# Patient Record
Sex: FEMALE | Race: WHITE | NOT HISPANIC OR LATINO | Employment: OTHER | URBAN - METROPOLITAN AREA
[De-identification: names, ages, dates, MRNs, and addresses within clinical notes are randomized per-mention and may not be internally consistent; named-entity substitution may affect disease eponyms.]

---

## 2023-03-17 DIAGNOSIS — E66.9 DIABETES MELLITUS TYPE 2 IN OBESE: Primary | ICD-10-CM

## 2023-03-17 DIAGNOSIS — E11.69 DIABETES MELLITUS TYPE 2 IN OBESE: Primary | ICD-10-CM

## 2023-03-17 DIAGNOSIS — I10 PRIMARY HYPERTENSION: ICD-10-CM

## 2023-03-17 RX ORDER — DULOXETIN HYDROCHLORIDE 60 MG/1
CAPSULE, DELAYED RELEASE ORAL
COMMUNITY
Start: 2016-07-26 | End: 2023-04-05 | Stop reason: SINTOL

## 2023-03-17 RX ORDER — TRIAMTERENE/HYDROCHLOROTHIAZID 37.5-25 MG
1 TABLET ORAL DAILY
COMMUNITY
Start: 2014-12-05 | End: 2023-06-30

## 2023-03-17 RX ORDER — FAMOTIDINE 40 MG/1
1 TABLET, FILM COATED ORAL 2 TIMES DAILY
COMMUNITY
Start: 2022-08-25

## 2023-03-17 RX ORDER — IBUPROFEN 200 MG
CAPSULE ORAL
COMMUNITY
Start: 2019-11-13

## 2023-03-17 RX ORDER — ALLOPURINOL 100 MG/1
TABLET ORAL 2 TIMES DAILY
COMMUNITY
End: 2023-04-05 | Stop reason: ALTCHOICE

## 2023-03-17 RX ORDER — DULOXETIN HYDROCHLORIDE 30 MG/1
CAPSULE, DELAYED RELEASE ORAL 2 TIMES DAILY
COMMUNITY
End: 2024-02-20 | Stop reason: WASHOUT

## 2023-03-17 RX ORDER — METOPROLOL SUCCINATE 50 MG/1
50 TABLET, EXTENDED RELEASE ORAL DAILY
Qty: 30 TABLET | Refills: 0 | Status: SHIPPED | OUTPATIENT
Start: 2023-03-17 | End: 2023-03-31

## 2023-03-17 RX ORDER — IBUPROFEN 600 MG/1
TABLET ORAL EVERY 6 HOURS
COMMUNITY
Start: 2022-08-15 | End: 2023-04-05 | Stop reason: ALTCHOICE

## 2023-03-17 RX ORDER — LEVOTHYROXINE SODIUM 150 UG/1
1 TABLET ORAL DAILY
COMMUNITY
Start: 2017-09-28 | End: 2023-04-06 | Stop reason: SDUPTHER

## 2023-03-17 RX ORDER — METOPROLOL SUCCINATE 50 MG/1
1 TABLET, EXTENDED RELEASE ORAL DAILY
COMMUNITY
Start: 2013-09-27 | End: 2023-03-17 | Stop reason: SDUPTHER

## 2023-03-17 RX ORDER — FUROSEMIDE 40 MG/1
1 TABLET ORAL DAILY
COMMUNITY
Start: 2015-05-06 | End: 2023-03-31 | Stop reason: SDUPTHER

## 2023-03-17 RX ORDER — FLUTICASONE PROPIONATE AND SALMETEROL 250; 50 UG/1; UG/1
1 POWDER RESPIRATORY (INHALATION)
COMMUNITY
Start: 2013-03-18

## 2023-03-17 RX ORDER — FAMOTIDINE 20 MG/1
TABLET, FILM COATED ORAL
COMMUNITY
Start: 2021-07-16 | End: 2023-04-05 | Stop reason: ALTCHOICE

## 2023-03-17 RX ORDER — LIDOCAINE 40 MG/G
CREAM TOPICAL
COMMUNITY
Start: 2020-08-03 | End: 2023-04-05 | Stop reason: ALTCHOICE

## 2023-03-17 RX ORDER — EZETIMIBE 10 MG/1
1 TABLET ORAL DAILY
COMMUNITY
Start: 2018-03-11 | End: 2023-03-31 | Stop reason: SDUPTHER

## 2023-03-17 RX ORDER — METFORMIN HYDROCHLORIDE 500 MG/1
1 TABLET ORAL 2 TIMES DAILY
COMMUNITY
Start: 2019-11-20 | End: 2023-03-17 | Stop reason: SDUPTHER

## 2023-03-17 RX ORDER — ALBUTEROL SULFATE 0.63 MG/3ML
0.63 SOLUTION RESPIRATORY (INHALATION)
COMMUNITY
Start: 2021-07-16

## 2023-03-17 RX ORDER — ALLOPURINOL 300 MG/1
1 TABLET ORAL DAILY
COMMUNITY
Start: 2015-10-23 | End: 2023-03-31 | Stop reason: SDUPTHER

## 2023-03-17 RX ORDER — MELOXICAM 7.5 MG/1
1 TABLET ORAL DAILY
COMMUNITY
Start: 2013-10-02 | End: 2023-04-05 | Stop reason: SINTOL

## 2023-03-17 RX ORDER — OLMESARTAN MEDOXOMIL 40 MG/1
1 TABLET ORAL DAILY
COMMUNITY
Start: 2017-07-07 | End: 2023-03-31 | Stop reason: SDUPTHER

## 2023-03-17 RX ORDER — ESOMEPRAZOLE MAGNESIUM 40 MG/1
40 CAPSULE, DELAYED RELEASE ORAL DAILY
COMMUNITY
End: 2023-07-19 | Stop reason: SDUPTHER

## 2023-03-17 RX ORDER — ERGOCALCIFEROL 1.25 MG/1
1 CAPSULE ORAL
COMMUNITY
Start: 2014-06-18 | End: 2023-04-06 | Stop reason: ALTCHOICE

## 2023-03-17 RX ORDER — ATORVASTATIN CALCIUM 40 MG/1
1 TABLET, FILM COATED ORAL DAILY
COMMUNITY
Start: 2014-06-18 | End: 2023-04-05 | Stop reason: SDUPTHER

## 2023-03-17 RX ORDER — SILVER SULFADIAZINE 10 G/1000G
CREAM TOPICAL 2 TIMES DAILY
COMMUNITY
Start: 2022-02-14

## 2023-03-17 RX ORDER — OLOPATADINE HYDROCHLORIDE 2 MG/ML
SOLUTION/ DROPS OPHTHALMIC
COMMUNITY
Start: 2013-04-26

## 2023-03-17 RX ORDER — TRIAMCINOLONE ACETONIDE 1 MG/G
CREAM TOPICAL
COMMUNITY
Start: 2019-08-14 | End: 2023-04-05 | Stop reason: ALTCHOICE

## 2023-03-17 RX ORDER — COLCHICINE 0.6 MG/1
1 TABLET ORAL 3 TIMES DAILY
COMMUNITY
Start: 2021-03-11 | End: 2023-04-05 | Stop reason: ALTCHOICE

## 2023-03-17 RX ORDER — MUPIROCIN 20 MG/G
OINTMENT TOPICAL
COMMUNITY
Start: 2022-02-21

## 2023-03-17 RX ORDER — LEVALBUTEROL TARTRATE 45 UG/1
2 AEROSOL, METERED ORAL 4 TIMES DAILY
COMMUNITY
Start: 2013-03-04

## 2023-03-17 RX ORDER — ACETAMINOPHEN 500 MG
TABLET ORAL EVERY 6 HOURS
COMMUNITY
Start: 2022-08-15

## 2023-03-17 RX ORDER — LANOLIN ALCOHOL/MO/W.PET/CERES
1 CREAM (GRAM) TOPICAL DAILY
COMMUNITY
Start: 2020-03-25

## 2023-03-17 RX ORDER — LEVOTHYROXINE SODIUM 75 UG/1
CAPSULE ORAL
COMMUNITY
End: 2023-04-06 | Stop reason: ALTCHOICE

## 2023-03-17 RX ORDER — METFORMIN HYDROCHLORIDE 500 MG/1
500 TABLET ORAL 2 TIMES DAILY
Qty: 60 TABLET | Refills: 0 | Status: SHIPPED | OUTPATIENT
Start: 2023-03-17 | End: 2023-03-31

## 2023-03-17 RX ORDER — ATORVASTATIN CALCIUM 20 MG/1
1 TABLET, FILM COATED ORAL DAILY
COMMUNITY
End: 2023-04-05 | Stop reason: ALTCHOICE

## 2023-03-17 RX ORDER — PANTOPRAZOLE SODIUM 40 MG/1
1 TABLET, DELAYED RELEASE ORAL DAILY
COMMUNITY
Start: 2021-07-16 | End: 2023-04-05 | Stop reason: ALTCHOICE

## 2023-03-17 RX ORDER — ACETAMINOPHEN 325 MG/1
TABLET ORAL EVERY 6 HOURS PRN
COMMUNITY
Start: 2016-08-04 | End: 2023-04-05 | Stop reason: ALTCHOICE

## 2023-04-04 PROBLEM — L75.0 BODY ODOR: Status: ACTIVE | Noted: 2023-04-04

## 2023-04-04 PROBLEM — M54.9 BACK PAIN: Status: ACTIVE | Noted: 2023-04-04

## 2023-04-04 PROBLEM — K64.8 OTHER HEMORRHOIDS: Status: ACTIVE | Noted: 2023-04-04

## 2023-04-04 PROBLEM — J02.9 SORE THROAT: Status: ACTIVE | Noted: 2023-04-04

## 2023-04-04 PROBLEM — J11.1 INFLUENZA: Status: ACTIVE | Noted: 2023-04-04

## 2023-04-04 PROBLEM — E66.813 CLASS 3 SEVERE OBESITY DUE TO EXCESS CALORIES WITH BODY MASS INDEX (BMI) OF 40.0 TO 44.9 IN ADULT: Status: ACTIVE | Noted: 2023-04-04

## 2023-04-04 PROBLEM — J45.909 ASTHMA (HHS-HCC): Status: ACTIVE | Noted: 2023-04-04

## 2023-04-04 PROBLEM — K21.9 ESOPHAGEAL REFLUX: Status: ACTIVE | Noted: 2023-04-04

## 2023-04-04 PROBLEM — G56.03 CARPAL TUNNEL SYNDROME ON BOTH SIDES: Status: ACTIVE | Noted: 2023-04-04

## 2023-04-04 PROBLEM — H10.9 CONJUNCTIVITIS: Status: ACTIVE | Noted: 2023-04-04

## 2023-04-04 PROBLEM — T30.0 PARTIAL THICKNESS AND FULL THICKNESS BURNS: Status: ACTIVE | Noted: 2023-04-04

## 2023-04-04 PROBLEM — B37.0 ORAL YEAST INFECTION: Status: ACTIVE | Noted: 2023-04-04

## 2023-04-04 PROBLEM — M79.643 HAND PAIN: Status: ACTIVE | Noted: 2023-04-04

## 2023-04-04 PROBLEM — E79.0 ELEVATED BLOOD URIC ACID LEVEL: Status: ACTIVE | Noted: 2023-04-04

## 2023-04-04 PROBLEM — R32 URINARY INCONTINENCE: Status: ACTIVE | Noted: 2023-04-04

## 2023-04-04 PROBLEM — R22.1 NECK MASS: Status: ACTIVE | Noted: 2023-04-04

## 2023-04-04 PROBLEM — B37.9 YEAST INFECTION: Status: ACTIVE | Noted: 2023-04-04

## 2023-04-04 PROBLEM — E66.01 CLASS 3 SEVERE OBESITY DUE TO EXCESS CALORIES WITH BODY MASS INDEX (BMI) OF 40.0 TO 44.9 IN ADULT (MULTI): Status: ACTIVE | Noted: 2023-04-04

## 2023-04-04 PROBLEM — M50.20 HERNIATION OF INTERVERTEBRAL DISC OF CERVICAL REGION: Status: ACTIVE | Noted: 2023-04-04

## 2023-04-04 PROBLEM — F41.8 DEPRESSION WITH ANXIETY: Status: ACTIVE | Noted: 2023-04-04

## 2023-04-04 PROBLEM — M54.50 LOW BACK PAIN: Status: ACTIVE | Noted: 2023-04-04

## 2023-04-04 PROBLEM — R05.9 COUGH: Status: ACTIVE | Noted: 2023-04-04

## 2023-04-04 PROBLEM — E53.8 VITAMIN B 12 DEFICIENCY: Status: ACTIVE | Noted: 2023-04-04

## 2023-04-04 PROBLEM — J31.0 RHINITIS: Status: ACTIVE | Noted: 2023-04-04

## 2023-04-04 PROBLEM — M10.9 GOUT: Status: ACTIVE | Noted: 2023-04-04

## 2023-04-04 PROBLEM — M25.561 BILATERAL KNEE PAIN: Status: ACTIVE | Noted: 2023-04-04

## 2023-04-04 PROBLEM — I10 HYPERTENSION: Status: ACTIVE | Noted: 2023-04-04

## 2023-04-04 PROBLEM — J44.9 COPD (CHRONIC OBSTRUCTIVE PULMONARY DISEASE) (MULTI): Status: ACTIVE | Noted: 2023-04-04

## 2023-04-04 PROBLEM — I25.10 ARTERIOSCLEROSIS OF CORONARY ARTERY: Status: ACTIVE | Noted: 2023-04-04

## 2023-04-04 PROBLEM — D64.9 ANEMIA: Status: ACTIVE | Noted: 2023-04-04

## 2023-04-04 PROBLEM — M54.2 NECK PAIN: Status: ACTIVE | Noted: 2023-04-04

## 2023-04-04 PROBLEM — E11.51 DIABETES TYPE 2 WITH ATHEROSCLEROSIS OF ARTERIES OF EXTREMITIES (MULTI): Status: ACTIVE | Noted: 2023-04-04

## 2023-04-04 PROBLEM — M79.673 FOOT PAIN: Status: ACTIVE | Noted: 2023-04-04

## 2023-04-04 PROBLEM — R06.02 SOB (SHORTNESS OF BREATH): Status: ACTIVE | Noted: 2023-04-04

## 2023-04-04 PROBLEM — L30.9 DERMATITIS: Status: ACTIVE | Noted: 2023-04-04

## 2023-04-04 PROBLEM — E78.2 HYPERLIPIDEMIA, MIXED: Status: ACTIVE | Noted: 2023-04-04

## 2023-04-04 PROBLEM — M79.605 LEFT LEG PAIN: Status: ACTIVE | Noted: 2023-04-04

## 2023-04-04 PROBLEM — G47.33 OBSTRUCTIVE SLEEP APNEA: Status: ACTIVE | Noted: 2023-04-04

## 2023-04-04 PROBLEM — E03.9 HYPOTHYROIDISM: Status: ACTIVE | Noted: 2023-04-04

## 2023-04-04 PROBLEM — M25.50 ARTHRALGIA: Status: ACTIVE | Noted: 2023-04-04

## 2023-04-04 PROBLEM — R76.8 ANA POSITIVE: Status: ACTIVE | Noted: 2023-04-04

## 2023-04-04 PROBLEM — R60.0 EDEMA OF BOTH LEGS: Status: ACTIVE | Noted: 2023-04-04

## 2023-04-04 PROBLEM — M25.562 BILATERAL KNEE PAIN: Status: ACTIVE | Noted: 2023-04-04

## 2023-04-04 PROBLEM — H66.91 OTITIS OF RIGHT EAR: Status: ACTIVE | Noted: 2023-04-04

## 2023-04-04 PROBLEM — Z91.81 STATUS POST FALL: Status: ACTIVE | Noted: 2023-04-04

## 2023-04-04 PROBLEM — R79.9 ABNORMAL BLOOD CHEMISTRY: Status: ACTIVE | Noted: 2023-04-04

## 2023-04-04 PROBLEM — I70.209 DIABETES TYPE 2 WITH ATHEROSCLEROSIS OF ARTERIES OF EXTREMITIES (MULTI): Status: ACTIVE | Noted: 2023-04-04

## 2023-04-04 PROBLEM — E55.9 MILD VITAMIN D DEFICIENCY: Status: ACTIVE | Noted: 2023-04-04

## 2023-04-04 PROBLEM — R19.7 DIARRHEA: Status: ACTIVE | Noted: 2023-04-04

## 2023-04-04 RX ORDER — ISOPROPYL ALCOHOL 70 ML/100ML
SWAB TOPICAL
COMMUNITY

## 2023-04-04 RX ORDER — LANCETS
EACH MISCELLANEOUS
COMMUNITY
Start: 2019-11-13

## 2023-04-04 RX ORDER — BENZONATATE 200 MG/1
200 CAPSULE ORAL 3 TIMES DAILY PRN
COMMUNITY
Start: 2022-11-30 | End: 2023-04-05 | Stop reason: ALTCHOICE

## 2023-04-04 RX ORDER — GUAIFENESIN 100 MG/5ML
200 SOLUTION ORAL EVERY 4 HOURS
COMMUNITY

## 2023-04-05 ENCOUNTER — OFFICE VISIT (OUTPATIENT)
Dept: PRIMARY CARE | Facility: CLINIC | Age: 83
End: 2023-04-05
Payer: MEDICARE

## 2023-04-05 VITALS
BODY MASS INDEX: 44.93 KG/M2 | DIASTOLIC BLOOD PRESSURE: 78 MMHG | SYSTOLIC BLOOD PRESSURE: 126 MMHG | TEMPERATURE: 97 F | WEIGHT: 238 LBS | HEIGHT: 61 IN

## 2023-04-05 DIAGNOSIS — M19.90 ARTHRITIS: ICD-10-CM

## 2023-04-05 DIAGNOSIS — J44.9 CHRONIC OBSTRUCTIVE PULMONARY DISEASE, UNSPECIFIED COPD TYPE (MULTI): ICD-10-CM

## 2023-04-05 DIAGNOSIS — E03.9 HYPOTHYROIDISM, UNSPECIFIED TYPE: ICD-10-CM

## 2023-04-05 DIAGNOSIS — I70.209 DIABETES TYPE 2 WITH ATHEROSCLEROSIS OF ARTERIES OF EXTREMITIES (MULTI): Primary | ICD-10-CM

## 2023-04-05 DIAGNOSIS — R53.83 MALAISE AND FATIGUE: ICD-10-CM

## 2023-04-05 DIAGNOSIS — E66.01 CLASS 3 SEVERE OBESITY DUE TO EXCESS CALORIES WITH BODY MASS INDEX (BMI) OF 40.0 TO 44.9 IN ADULT, UNSPECIFIED WHETHER SERIOUS COMORBIDITY PRESENT (MULTI): ICD-10-CM

## 2023-04-05 DIAGNOSIS — E66.9 DIABETES MELLITUS TYPE 2 IN OBESE: ICD-10-CM

## 2023-04-05 DIAGNOSIS — E11.69 DIABETES MELLITUS TYPE 2 IN OBESE: ICD-10-CM

## 2023-04-05 DIAGNOSIS — E53.8 VITAMIN B 12 DEFICIENCY: ICD-10-CM

## 2023-04-05 DIAGNOSIS — E55.9 MILD VITAMIN D DEFICIENCY: ICD-10-CM

## 2023-04-05 DIAGNOSIS — R53.81 MALAISE AND FATIGUE: ICD-10-CM

## 2023-04-05 DIAGNOSIS — E78.5 HYPERLIPIDEMIA, UNSPECIFIED HYPERLIPIDEMIA TYPE: ICD-10-CM

## 2023-04-05 DIAGNOSIS — I10 PRIMARY HYPERTENSION: ICD-10-CM

## 2023-04-05 DIAGNOSIS — E78.2 HYPERLIPIDEMIA, MIXED: ICD-10-CM

## 2023-04-05 DIAGNOSIS — F33.9 DEPRESSION, RECURRENT (CMS-HCC): ICD-10-CM

## 2023-04-05 DIAGNOSIS — E11.51 DIABETES TYPE 2 WITH ATHEROSCLEROSIS OF ARTERIES OF EXTREMITIES (MULTI): Primary | ICD-10-CM

## 2023-04-05 PROCEDURE — 86235 NUCLEAR ANTIGEN ANTIBODY: CPT

## 2023-04-05 PROCEDURE — 86225 DNA ANTIBODY NATIVE: CPT

## 2023-04-05 PROCEDURE — 82607 VITAMIN B-12: CPT | Performed by: INTERNAL MEDICINE

## 2023-04-05 PROCEDURE — 86039 ANTINUCLEAR ANTIBODIES (ANA): CPT

## 2023-04-05 PROCEDURE — 1036F TOBACCO NON-USER: CPT | Performed by: INTERNAL MEDICINE

## 2023-04-05 PROCEDURE — 1157F ADVNC CARE PLAN IN RCRD: CPT | Performed by: INTERNAL MEDICINE

## 2023-04-05 PROCEDURE — 1159F MED LIST DOCD IN RCRD: CPT | Performed by: INTERNAL MEDICINE

## 2023-04-05 PROCEDURE — 84550 ASSAY OF BLOOD/URIC ACID: CPT

## 2023-04-05 PROCEDURE — 1170F FXNL STATUS ASSESSED: CPT | Performed by: INTERNAL MEDICINE

## 2023-04-05 PROCEDURE — 80053 COMPREHEN METABOLIC PANEL: CPT | Performed by: INTERNAL MEDICINE

## 2023-04-05 PROCEDURE — 3078F DIAST BP <80 MM HG: CPT | Performed by: INTERNAL MEDICINE

## 2023-04-05 PROCEDURE — 82306 VITAMIN D 25 HYDROXY: CPT | Performed by: INTERNAL MEDICINE

## 2023-04-05 PROCEDURE — 86038 ANTINUCLEAR ANTIBODIES: CPT

## 2023-04-05 PROCEDURE — 85652 RBC SED RATE AUTOMATED: CPT

## 2023-04-05 PROCEDURE — 1160F RVW MEDS BY RX/DR IN RCRD: CPT | Performed by: INTERNAL MEDICINE

## 2023-04-05 PROCEDURE — 83036 HEMOGLOBIN GLYCOSYLATED A1C: CPT | Performed by: INTERNAL MEDICINE

## 2023-04-05 PROCEDURE — 80061 LIPID PANEL: CPT | Performed by: INTERNAL MEDICINE

## 2023-04-05 PROCEDURE — 84443 ASSAY THYROID STIM HORMONE: CPT | Performed by: INTERNAL MEDICINE

## 2023-04-05 PROCEDURE — 3074F SYST BP LT 130 MM HG: CPT | Performed by: INTERNAL MEDICINE

## 2023-04-05 PROCEDURE — G0439 PPPS, SUBSEQ VISIT: HCPCS | Performed by: INTERNAL MEDICINE

## 2023-04-05 PROCEDURE — 99213 OFFICE O/P EST LOW 20 MIN: CPT | Performed by: INTERNAL MEDICINE

## 2023-04-05 PROCEDURE — 85025 COMPLETE CBC W/AUTO DIFF WBC: CPT | Performed by: INTERNAL MEDICINE

## 2023-04-05 PROCEDURE — 86431 RHEUMATOID FACTOR QUANT: CPT

## 2023-04-05 PROCEDURE — 36415 COLL VENOUS BLD VENIPUNCTURE: CPT | Performed by: INTERNAL MEDICINE

## 2023-04-05 RX ORDER — ATORVASTATIN CALCIUM 40 MG/1
40 TABLET, FILM COATED ORAL DAILY
Qty: 30 TABLET | Refills: 3 | Status: SHIPPED | OUTPATIENT
Start: 2023-04-05 | End: 2023-08-23

## 2023-04-05 RX ORDER — METOPROLOL SUCCINATE 50 MG/1
50 TABLET, EXTENDED RELEASE ORAL DAILY
Qty: 30 TABLET | Refills: 3 | Status: SHIPPED | OUTPATIENT
Start: 2023-04-05 | End: 2023-07-19 | Stop reason: SDUPTHER

## 2023-04-05 RX ORDER — EZETIMIBE 10 MG/1
10 TABLET ORAL DAILY
Qty: 30 TABLET | Refills: 6 | Status: SHIPPED | OUTPATIENT
Start: 2023-04-05 | End: 2023-07-19 | Stop reason: SDUPTHER

## 2023-04-05 RX ORDER — METFORMIN HYDROCHLORIDE 500 MG/1
500 TABLET ORAL
Qty: 60 TABLET | Refills: 2 | Status: SHIPPED | OUTPATIENT
Start: 2023-04-05 | End: 2023-08-07

## 2023-04-05 RX ORDER — FUROSEMIDE 40 MG/1
40 TABLET ORAL DAILY
Qty: 30 TABLET | Refills: 6 | Status: SHIPPED | OUTPATIENT
Start: 2023-04-05 | End: 2023-07-19 | Stop reason: SINTOL

## 2023-04-05 RX ORDER — OLMESARTAN MEDOXOMIL 40 MG/1
40 TABLET ORAL DAILY
Qty: 30 TABLET | Refills: 6 | Status: SHIPPED | OUTPATIENT
Start: 2023-04-05 | End: 2024-02-20 | Stop reason: SDUPTHER

## 2023-04-05 ASSESSMENT — PATIENT HEALTH QUESTIONNAIRE - PHQ9
8. MOVING OR SPEAKING SO SLOWLY THAT OTHER PEOPLE COULD HAVE NOTICED. OR THE OPPOSITE, BEING SO FIGETY OR RESTLESS THAT YOU HAVE BEEN MOVING AROUND A LOT MORE THAN USUAL: NOT AT ALL
3. TROUBLE FALLING OR STAYING ASLEEP OR SLEEPING TOO MUCH: NOT AT ALL
5. POOR APPETITE OR OVEREATING: SEVERAL DAYS
4. FEELING TIRED OR HAVING LITTLE ENERGY: SEVERAL DAYS
6. FEELING BAD ABOUT YOURSELF - OR THAT YOU ARE A FAILURE OR HAVE LET YOURSELF OR YOUR FAMILY DOWN: NOT AT ALL
SUM OF ALL RESPONSES TO PHQ QUESTIONS 1-9: 6
SUM OF ALL RESPONSES TO PHQ9 QUESTIONS 1 AND 2: 3
2. FEELING DOWN, DEPRESSED OR HOPELESS: NOT AT ALL
9. THOUGHTS THAT YOU WOULD BE BETTER OFF DEAD, OR OF HURTING YOURSELF: NOT AT ALL
10. IF YOU CHECKED OFF ANY PROBLEMS, HOW DIFFICULT HAVE THESE PROBLEMS MADE IT FOR YOU TO DO YOUR WORK, TAKE CARE OF THINGS AT HOME, OR GET ALONG WITH OTHER PEOPLE: SOMEWHAT DIFFICULT
1. LITTLE INTEREST OR PLEASURE IN DOING THINGS: NEARLY EVERY DAY
7. TROUBLE CONCENTRATING ON THINGS, SUCH AS READING THE NEWSPAPER OR WATCHING TELEVISION: SEVERAL DAYS

## 2023-04-05 ASSESSMENT — ACTIVITIES OF DAILY LIVING (ADL)
WALKS IN HOME: INDEPENDENT
FEEDING YOURSELF: INDEPENDENT
MANAGING FINANCES: NEEDS ASSISTANCE
USING TRANSPORTATION: NEEDS ASSISTANCE
PREPARING MEALS: NEEDS ASSISTANCE
BATHING: NEEDS ASSISTANCE
ADEQUATE_TO_COMPLETE_ADL: YES
ADEQUATE_TO_COMPLETE_ADL: YES
BATHING: NEEDS ASSISTANCE
TOILETING: NEEDS ASSISTANCE
JUDGMENT_ADEQUATE_SAFELY_COMPLETE_DAILY_ACTIVITIES: YES
DRESSING YOURSELF: NEEDS ASSISTANCE
JUDGMENT_ADEQUATE_SAFELY_COMPLETE_DAILY_ACTIVITIES: YES
TAKING MEDICATION: INDEPENDENT
EATING: INDEPENDENT
NEEDS ASSISTANCE WITH FOOD: INDEPENDENT
USING TELEPHONE: NEEDS ASSISTANCE
HEARING - RIGHT EAR: FUNCTIONAL
PILL BOX USED: NO
DOING HOUSEWORK: NEEDS ASSISTANCE
STIL DRIVING: YES
PATIENT'S MEMORY ADEQUATE TO SAFELY COMPLETE DAILY ACTIVITIES?: YES
DRESSING: NEEDS ASSISTANCE
HEARING - LEFT EAR: FUNCTIONAL
GROOMING: INDEPENDENT
TOILETING: INDEPENDENT
GROCERY SHOPPING: NEEDS ASSISTANCE
FEEDING: INDEPENDENT

## 2023-04-05 ASSESSMENT — ENCOUNTER SYMPTOMS
FATIGUE: 1
HEADACHES: 0
DEPRESSION: 0
NECK PAIN: 1
LIGHT-HEADEDNESS: 0
LOSS OF SENSATION IN FEET: 0
DIARRHEA: 1
BACK PAIN: 1
ARTHRALGIAS: 1
COUGH: 1
OCCASIONAL FEELINGS OF UNSTEADINESS: 0
SLEEP DISTURBANCE: 1
SHORTNESS OF BREATH: 1

## 2023-04-05 ASSESSMENT — ANXIETY QUESTIONNAIRES
4. TROUBLE RELAXING: NOT AT ALL
1. FEELING NERVOUS, ANXIOUS, OR ON EDGE: NOT AT ALL
2. NOT BEING ABLE TO STOP OR CONTROL WORRYING: NOT AT ALL
6. BECOMING EASILY ANNOYED OR IRRITABLE: SEVERAL DAYS
GAD7 TOTAL SCORE: 1
3. WORRYING TOO MUCH ABOUT DIFFERENT THINGS: NOT AT ALL
7. FEELING AFRAID AS IF SOMETHING AWFUL MIGHT HAPPEN: NOT AT ALL
5. BEING SO RESTLESS THAT IT IS HARD TO SIT STILL: NOT AT ALL

## 2023-04-05 ASSESSMENT — COLUMBIA-SUICIDE SEVERITY RATING SCALE - C-SSRS
6. HAVE YOU EVER DONE ANYTHING, STARTED TO DO ANYTHING, OR PREPARED TO DO ANYTHING TO END YOUR LIFE?: NO
2. HAVE YOU ACTUALLY HAD ANY THOUGHTS OF KILLING YOURSELF?: NO

## 2023-04-05 NOTE — PROGRESS NOTES
Subjective     Patient is presented for follow up.  Has malaise, fatigue.  Patient has low back pain.  She is here for , medications refills.    HPI    This is an 82 years old Polish speaking lady with medical history significant for hypertension, asthma, COPD, hypothyroidism, diabetes diet controlled, obesity, arthralgia, degenerative joint disease, hyperlipidemia, anxiety, depression, urinary incontinence, s/p R carpal tunnel syndrome repair, s/p Hospitalization to [facility] 1/24/2019 to 11/25/2019 for CHF exacerbation, s/p ED visit 8/14/2022, s/p hospitalization for shortness of breath, asthma exacerbation, COVID, COPD exacerbation 11/25/2022 to 11/29/2022.     Patient is  presented for follow up.   Has low back pain, has difficulty to change her position.   Taking medications as directed, but was of Synthroid for few weeks.     Stated, that has been having low back pain, difficulty to ambulate.     Has been having pain, taking Tylenol.     Stated, the blood glucose is stable, well controlled.  Has no issues with blood pressure control.  Has been having fatigue and malaise.  Depressed.  Has episodes of urinary incontinence.        Review of Systems   Constitutional:  Positive for fatigue.   Respiratory:  Positive for cough and shortness of breath.    Cardiovascular:  Negative for chest pain.   Gastrointestinal:  Positive for diarrhea.   Musculoskeletal:  Positive for arthralgias, back pain, gait problem and neck pain.   Neurological:  Negative for light-headedness and headaches.   Psychiatric/Behavioral:  Positive for sleep disturbance.        Objective        Vitals:    04/05/23 0943   BP: 126/78   Temp: 36.1 °C (97 °F)        Physical Exam  Constitutional:       Appearance: Normal appearance.   HENT:      Head: Normocephalic and atraumatic.      Nose: Nose normal.      Mouth/Throat:      Mouth: Mucous membranes are moist.   Eyes:      Extraocular Movements: Extraocular movements intact.      Pupils:  Pupils are equal, round, and reactive to light.   Cardiovascular:      Rate and Rhythm: Normal rate and regular rhythm.      Pulses: Normal pulses.      Heart sounds: Normal heart sounds.   Pulmonary:      Effort: Pulmonary effort is normal.      Breath sounds: Normal breath sounds.   Abdominal:      Palpations: Abdomen is soft.   Musculoskeletal:         General: Normal range of motion.      Cervical back: Normal range of motion and neck supple.   Skin:     General: Skin is warm and dry.   Neurological:      General: No focal deficit present.      Mental Status: She is alert and oriented to person, place, and time.   Psychiatric:         Mood and Affect: Mood normal.         Behavior: Behavior normal.       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  -     furosemide (Lasix) 40 mg tablet; Take 1 tablet (40 mg) by mouth once daily.  -     metoprolol succinate XL (Toprol-XL) 50 mg 24 hr tablet; Take 1 tablet (50 mg) by mouth once daily. Do not crush or chew.  Mild vitamin D deficiency  -     Vitamin D 1,25 Dihydroxy  -     ergocalciferol (Vitamin D-2) 1.25 MG (10199 UT) capsule; Take 1 capsule (1,250 mcg) by mouth 1 (one) time per week.  Vitamin B 12 deficiency  -     Vitamin B12  Hyperlipidemia, mixed  -     Comprehensive Metabolic Panel  -     Lipid Panel  -     atorvastatin (Lipitor) 40 mg tablet; Take 1 tablet (40 mg) by mouth once daily.  Malaise and fatigue  -     CBC  -     Thyroid Stimulating Hormone  Hyperlipidemia, unspecified hyperlipidemia type  -     ezetimibe (Zetia) 10 mg tablet; Take 1 tablet (10 mg) by mouth once daily.  Diabetes mellitus type 2 in obese (CMS/[facility])  -     metFORMIN (Glucophage) 500 mg tablet; Take 1 tablet (500 mg) by mouth in the morning  and 1 tablet (500 mg) in the evening. Take with meals.  -     SITagliptin phosphate (Januvia) 50 mg tablet; Take 1 tablet (50 mg) by mouth once daily.  Arthritis  -     Arthritis Panel (CMS)  Hypothyroidism, unspecified type  -     levothyroxine (Synthroid, Levoxyl) 150 mcg tablet; Take 1 tablet (150 mcg) by mouth once daily.  Depression, recurrent (CMS/HCC)  Chronic obstructive pulmonary disease, unspecified COPD type (CMS/[facility])  Class 3 severe obesity due to excess calories with body mass index (BMI) of 40.0 to 44.9 in adult, unspecified whether serious comorbidity present (CMS/[facility])     - Has elevated TSH  of 8.4.  Was off Synthroid for few weeks.  Resume Synthroid 150 mcg 30 min before breakfast.    - H of  hospitalization [facility] for shortness of breath, asthma exacerbation, COVID, COPD exacerbation 11/25/2022 to 11/29/2022.   Avoid infection.  Advised to continue Current therapy.  Discussed with patient about medications administration.     - HTN.  Very well controlled.  Continue to take Current medications.  Exercise, weight loss.  Avoid salt.     - Degenerative joint disease.  Low back pain.  Take Tylenol as needed.  Exercises much as you can.  Consider physical therapy.     - H of R thigh skin burn 2-3 degree.  Healed.     - Left leg varicosities.  Use compressive stockings daily.     - Spinal stenosis.  Low back pain.  PT, exercise.  Take Tylenol as needed.     - Gastroesophageal reflux disease.  Eat small portions.  Avoid Caffeine, spicy foods, chocolate, alcohol.  Do not wear tight clothes.  Keep last meal before bed time > 3 hours.  Keep head side of the bed elevated at all time.  Continue to take as omeprazole.     -Gout.  Controlled now.  Continue to take allopurinol.  Keep a strict diet.  Avoid meat, legumes, alcohol     - Diabetes type 2.  Well-controlled now.  Check your blood glucose daily.  Exercise, weight loss, diet.  Continue Januvia 50 mg daily.  Hb A1c is 6.1     -Morbid Obesity with  BMI is 44.98.  Patient is keeping diet, cannot exercise, due to bilateral knee pain.  Encouraged ambulation.  Exercise, keep your portions small.     -Obstructive sleep apnea.  Stated, that using mask at nighttime.     - H of right carpal tunnel syndrome repair.  Healed, improved.  Follow-up with orthopedic surgery as needed.     -Depression, anxiety.  Follow-up with psychiatry.  Current medications.     - Urinary incontinence.  Protective wear.     -Health maintenance.  Medicare Wellness Annual exam is  today.  Declined vaccinations.  GYN exam, mammography declined.     -Dyslipidemia  Continue with the low fat, low cholesterol diet  I recommend Mediterranean diet, which include fish, chicken, vegetables and olive oil  Exercise daily for 30 minutes at least 3 times a week  Continue current medications  Report any side effects, such as,  Muscle ache. Muscle weakness, abdominal pain or discomfort     - S/p Fall episode, s/p ED visit 8/14/2022.   Stable, improved.     -Shortness of breath episodes.  Continue to take diuretics, helping.  Furosemide 40 mg once a day.     - Has unsteady gait, in need for   Walker with wheels and seat     Briana Crenshaw MD

## 2023-04-06 PROBLEM — F33.9 DEPRESSION, RECURRENT (CMS-HCC): Status: ACTIVE | Noted: 2023-04-06

## 2023-04-06 LAB
RHEUMATOID FACTOR (IU/ML) IN SERUM OR PLASMA: <10 IU/ML (ref 0–15)
SEDIMENTATION RATE, ERYTHROCYTE: 43 MM/H (ref 0–30)
URATE (MG/DL) IN SER/PLAS: 7 MG/DL (ref 2.3–6.7)

## 2023-04-06 RX ORDER — LEVOTHYROXINE SODIUM 150 UG/1
150 TABLET ORAL DAILY
Qty: 30 TABLET | Refills: 3 | Status: SHIPPED | OUTPATIENT
Start: 2023-04-06 | End: 2023-07-31

## 2023-04-06 RX ORDER — ERGOCALCIFEROL 1.25 MG/1
1 CAPSULE ORAL
Qty: 4 CAPSULE | Refills: 1 | Status: SHIPPED | OUTPATIENT
Start: 2023-04-06 | End: 2023-06-08

## 2023-04-07 LAB
ANA PATTERN: ABNORMAL
ANA TITER: ABNORMAL
ANTI-CENTROMERE: <0.2 AI
ANTI-CHROMATIN: <0.2 AI
ANTI-DNA (DS): <1 IU/ML
ANTI-JO-1 IGG: <0.2 AI
ANTI-NUCLEAR ANTIBODY (ANA): POSITIVE
ANTI-RIBOSOMAL P: <0.2 AI
ANTI-RNP: <0.2 AI
ANTI-SCL-70: <0.2 AI
ANTI-SM/RNP: <0.2 AI
ANTI-SM: <0.2 AI
ANTI-SSA: <0.2 AI
ANTI-SSB: <0.2 AI

## 2023-05-08 DIAGNOSIS — I10 ESSENTIAL (PRIMARY) HYPERTENSION: ICD-10-CM

## 2023-06-30 RX ORDER — TRIAMTERENE/HYDROCHLOROTHIAZID 37.5-25 MG
TABLET ORAL
Qty: 30 TABLET | Refills: 6 | Status: SHIPPED | OUTPATIENT
Start: 2023-06-30 | End: 2023-07-19 | Stop reason: ALTCHOICE

## 2023-07-03 DIAGNOSIS — E55.9 MILD VITAMIN D DEFICIENCY: ICD-10-CM

## 2023-07-03 RX ORDER — ERGOCALCIFEROL 1.25 MG/1
CAPSULE ORAL
Qty: 4 CAPSULE | Refills: 0 | Status: SHIPPED | OUTPATIENT
Start: 2023-07-03 | End: 2023-07-20

## 2023-07-08 PROCEDURE — G0179 MD RECERTIFICATION HHA PT: HCPCS | Performed by: INTERNAL MEDICINE

## 2023-07-19 ENCOUNTER — OFFICE VISIT (OUTPATIENT)
Dept: PRIMARY CARE | Facility: CLINIC | Age: 83
End: 2023-07-19
Payer: MEDICARE

## 2023-07-19 VITALS
HEART RATE: 76 BPM | RESPIRATION RATE: 16 BRPM | HEIGHT: 60 IN | TEMPERATURE: 97.7 F | SYSTOLIC BLOOD PRESSURE: 112 MMHG | WEIGHT: 230 LBS | BODY MASS INDEX: 45.16 KG/M2 | DIASTOLIC BLOOD PRESSURE: 68 MMHG

## 2023-07-19 DIAGNOSIS — K21.9 GASTROESOPHAGEAL REFLUX DISEASE, UNSPECIFIED WHETHER ESOPHAGITIS PRESENT: Primary | ICD-10-CM

## 2023-07-19 DIAGNOSIS — Z78.0 ASYMPTOMATIC MENOPAUSAL STATE: ICD-10-CM

## 2023-07-19 DIAGNOSIS — E66.9 DIABETES MELLITUS TYPE 2 IN OBESE: ICD-10-CM

## 2023-07-19 DIAGNOSIS — I70.209 DIABETES TYPE 2 WITH ATHEROSCLEROSIS OF ARTERIES OF EXTREMITIES (MULTI): ICD-10-CM

## 2023-07-19 DIAGNOSIS — M19.90 ARTHRITIS: ICD-10-CM

## 2023-07-19 DIAGNOSIS — I10 PRIMARY HYPERTENSION: ICD-10-CM

## 2023-07-19 DIAGNOSIS — E78.5 HYPERLIPIDEMIA, UNSPECIFIED HYPERLIPIDEMIA TYPE: ICD-10-CM

## 2023-07-19 DIAGNOSIS — E53.8 VITAMIN B 12 DEFICIENCY: ICD-10-CM

## 2023-07-19 DIAGNOSIS — E03.9 HYPOTHYROIDISM, UNSPECIFIED TYPE: ICD-10-CM

## 2023-07-19 DIAGNOSIS — D50.9 IRON DEFICIENCY ANEMIA, UNSPECIFIED IRON DEFICIENCY ANEMIA TYPE: ICD-10-CM

## 2023-07-19 DIAGNOSIS — E11.51 DIABETES TYPE 2 WITH ATHEROSCLEROSIS OF ARTERIES OF EXTREMITIES (MULTI): ICD-10-CM

## 2023-07-19 DIAGNOSIS — E53.8 FOLATE DEFICIENCY: ICD-10-CM

## 2023-07-19 DIAGNOSIS — E11.69 DIABETES MELLITUS TYPE 2 IN OBESE: ICD-10-CM

## 2023-07-19 DIAGNOSIS — E55.9 MILD VITAMIN D DEFICIENCY: ICD-10-CM

## 2023-07-19 LAB
CREAT UR STRIP-MCNC: 200 MG/DL
MICROALBUMIN UR TEST STR-MCNC: 10 MG/L
PROT/CREAT UR: <30 UG/MG CREAT
RHEUMATOID FACTOR (IU/ML) IN SERUM OR PLASMA: <10 IU/ML (ref 0–15)
SEDIMENTATION RATE, ERYTHROCYTE: 33 MM/H (ref 0–30)
URATE (MG/DL) IN SER/PLAS: 5.8 MG/DL (ref 2.3–6.7)

## 2023-07-19 PROCEDURE — 86039 ANTINUCLEAR ANTIBODIES (ANA): CPT

## 2023-07-19 PROCEDURE — 99213 OFFICE O/P EST LOW 20 MIN: CPT | Performed by: INTERNAL MEDICINE

## 2023-07-19 PROCEDURE — 85025 COMPLETE CBC W/AUTO DIFF WBC: CPT | Performed by: INTERNAL MEDICINE

## 2023-07-19 PROCEDURE — 86431 RHEUMATOID FACTOR QUANT: CPT

## 2023-07-19 PROCEDURE — 86038 ANTINUCLEAR ANTIBODIES: CPT

## 2023-07-19 PROCEDURE — 82043 UR ALBUMIN QUANTITATIVE: CPT | Performed by: INTERNAL MEDICINE

## 2023-07-19 PROCEDURE — 80053 COMPREHEN METABOLIC PANEL: CPT | Performed by: INTERNAL MEDICINE

## 2023-07-19 PROCEDURE — 82607 VITAMIN B-12: CPT | Performed by: INTERNAL MEDICINE

## 2023-07-19 PROCEDURE — 86225 DNA ANTIBODY NATIVE: CPT

## 2023-07-19 PROCEDURE — 1159F MED LIST DOCD IN RCRD: CPT | Performed by: INTERNAL MEDICINE

## 2023-07-19 PROCEDURE — 85652 RBC SED RATE AUTOMATED: CPT

## 2023-07-19 PROCEDURE — 82746 ASSAY OF FOLIC ACID SERUM: CPT | Performed by: INTERNAL MEDICINE

## 2023-07-19 PROCEDURE — 3074F SYST BP LT 130 MM HG: CPT | Performed by: INTERNAL MEDICINE

## 2023-07-19 PROCEDURE — 86235 NUCLEAR ANTIGEN ANTIBODY: CPT

## 2023-07-19 PROCEDURE — 1160F RVW MEDS BY RX/DR IN RCRD: CPT | Performed by: INTERNAL MEDICINE

## 2023-07-19 PROCEDURE — 82044 UR ALBUMIN SEMIQUANTITATIVE: CPT | Performed by: INTERNAL MEDICINE

## 2023-07-19 PROCEDURE — 1157F ADVNC CARE PLAN IN RCRD: CPT | Performed by: INTERNAL MEDICINE

## 2023-07-19 PROCEDURE — 83036 HEMOGLOBIN GLYCOSYLATED A1C: CPT | Performed by: INTERNAL MEDICINE

## 2023-07-19 PROCEDURE — 1036F TOBACCO NON-USER: CPT | Performed by: INTERNAL MEDICINE

## 2023-07-19 PROCEDURE — 84550 ASSAY OF BLOOD/URIC ACID: CPT

## 2023-07-19 PROCEDURE — 82306 VITAMIN D 25 HYDROXY: CPT | Performed by: INTERNAL MEDICINE

## 2023-07-19 PROCEDURE — 1125F AMNT PAIN NOTED PAIN PRSNT: CPT | Performed by: INTERNAL MEDICINE

## 2023-07-19 PROCEDURE — 3078F DIAST BP <80 MM HG: CPT | Performed by: INTERNAL MEDICINE

## 2023-07-19 PROCEDURE — 82570 ASSAY OF URINE CREATININE: CPT | Performed by: INTERNAL MEDICINE

## 2023-07-19 RX ORDER — EZETIMIBE 10 MG/1
10 TABLET ORAL DAILY
Qty: 30 TABLET | Refills: 11 | Status: SHIPPED | OUTPATIENT
Start: 2023-07-19 | End: 2024-02-20 | Stop reason: SDUPTHER

## 2023-07-19 RX ORDER — ESOMEPRAZOLE MAGNESIUM 40 MG/1
40 CAPSULE, DELAYED RELEASE ORAL
Qty: 30 CAPSULE | Refills: 3 | Status: SHIPPED | OUTPATIENT
Start: 2023-07-19 | End: 2024-02-20 | Stop reason: SDUPTHER

## 2023-07-19 RX ORDER — METOPROLOL SUCCINATE 50 MG/1
50 TABLET, EXTENDED RELEASE ORAL DAILY
Qty: 30 TABLET | Refills: 11 | Status: SHIPPED | OUTPATIENT
Start: 2023-07-19 | End: 2023-08-30

## 2023-07-19 RX ORDER — ALLOPURINOL 300 MG/1
300 TABLET ORAL DAILY
COMMUNITY
Start: 2023-07-03 | End: 2023-10-10

## 2023-07-19 ASSESSMENT — ENCOUNTER SYMPTOMS
FATIGUE: 1
COUGH: 1
ARTHRALGIAS: 1
ACTIVITY CHANGE: 1
SHORTNESS OF BREATH: 1
LIGHT-HEADEDNESS: 1
SLEEP DISTURBANCE: 1
HEADACHES: 1
UNEXPECTED WEIGHT CHANGE: 1
BACK PAIN: 1

## 2023-07-19 ASSESSMENT — PAIN SCALES - GENERAL: PAINLEVEL: 6

## 2023-07-19 NOTE — PROGRESS NOTES
Subjective    Estephania Fitzpatrick is a 83 y.o. female who presents for  follow up.  Weak.  Has bilateral leg edema.  Has cough, due to poor air quality.    HPI    This is an 82 years old [place] speaking lady with medical history significant for hypertension, asthma, COPD, hypothyroidism, diabetes diet controlled, obesity, arthralgia, degenerative joint disease, hyperlipidemia, anxiety, depression, urinary incontinence, s/p R carpal tunnel syndrome repair, s/p Hospitalization to [facility] 1/24/2019 to 11/25/2019 for CHF exacerbation, s/p ED visit 8/14/2022, s/p hospitalization for shortness of breath, asthma exacerbation, COVID, COPD exacerbation 11/25/2022 to 11/29/2022.     Patient is  presented for follow up.   Has low back pain, has difficulty to change her position.   Taking medications as directed, but was of Synthroid for few weeks.     Stated, that has been having low back pain, difficulty to ambulate.     Has been having pain, taking Tylenol.     Stated, the blood glucose is stable, well controlled.  Has no issues with blood pressure control.  Has been having fatigue and malaise.  Depressed.  Has episodes of urinary incontinence.     Review of Systems   Constitutional:  Positive for activity change, fatigue and unexpected weight change.   Respiratory:  Positive for cough and shortness of breath.    Cardiovascular:  Positive for leg swelling. Negative for chest pain.   Musculoskeletal:  Positive for arthralgias and back pain.   Neurological:  Positive for light-headedness and headaches.   Psychiatric/Behavioral:  Positive for sleep disturbance.        Objective        Vitals:    07/19/23 1352   BP: 112/68   Pulse: 76   Resp: 16   Temp: 36.5 °C (97.7 °F)        Physical Exam  Constitutional:       Appearance: Normal appearance.   HENT:      Head: Normocephalic.      Nose: Nose normal.   Eyes:      Pupils: Pupils are equal, round, and reactive to light.   Cardiovascular:      Rate and Rhythm: Regular  rhythm.      Heart sounds: Normal heart sounds.   Pulmonary:      Effort: Pulmonary effort is normal.      Breath sounds: Normal breath sounds. No wheezing or rhonchi.   Abdominal:      Palpations: Abdomen is soft.   Musculoskeletal:         General: Normal range of motion.      Cervical back: Normal range of motion.      Right lower leg: Edema present.      Left lower leg: Edema present.   Skin:     General: Skin is warm.   Neurological:      General: No focal deficit present.      Mental Status: She is alert. Mental status is at baseline.   Psychiatric:         Mood and Affect: Mood normal.         Thought Content: Thought content normal.       Diabetic foot exam.  Mild edema, good pulses, intact skin.  Diagnoses and all orders for this visit:  Gastroesophageal reflux disease, unspecified whether esophagitis present (Primary)  -     esomeprazole (NexIUM) 40 mg DR capsule; Take 1 capsule (40 mg) by mouth once daily in the morning. Take before meals.  Primary hypertension  -     metoprolol succinate XL (Toprol-XL) 50 mg 24 hr tablet; Take 1 tablet (50 mg) by mouth once daily. Do not crush or chew.  Diabetes mellitus type 2 in obese (CMS/HCC)  -     SITagliptin phosphate (Januvia) 50 mg tablet; Take 1 tablet (50 mg) by mouth once daily.  -     POCT ALBUMIN RANDOM URINE DOCKED DEVICE  -     Hemoglobin A1C  Hyperlipidemia, unspecified hyperlipidemia type  -     ezetimibe (Zetia) 10 mg tablet; Take 1 tablet (10 mg) by mouth once daily.  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  Worse now.  Eat small portions.  Avoid Caffeine, spicy foods, chocolate, alcohol.  Do not wear tight clothes.  Keep last meal before bed time > 3 hours.  Keep head side of the bed elevated at all time.  Resume  Nexium.     -Gout.  Controlled now.  Continue to take allopurinol.  Keep a strict diet.  Avoid meat, legumes, alcohol.     - Diabetes type 2.  Well-controlled now.  Check your blood glucose daily.  Exercise, weight loss, diet.  Continue Januvia 50 mg daily.  Blood work today.    - Hypothyroidism.  Had  elevated TSH  of 8.4.   Repeat TSH today.  Continue with Synthroid 150 mcg 30 min before breakfast for now.    - HTN.  Your blood pressure is low.  Hold on hydrochlorothiazide.  Stop Furosemide for now.  Exercise, weight loss.  Avoid salt.     - Degenerative joint disease.  Low back pain.  Take Tylenol as needed.  Exercises much as you can.  Consider physical therapy.     - H of  hospitalization [facility] for shortness of breath, asthma exacerbation, COVID, COPD exacerbation 11/25/2022 to 11/29/2022.   Avoid infection.  Advised to continue Current therapy.  Discussed with patient about medications administration.     - H of R thigh skin burn 2-3 degree.  Healed.     - Left leg varicosities.  Use compressive stockings daily.     - Spinal stenosis.  Low back pain.  PT, exercise.  Take Tylenol as needed.     -Morbid Obesity with BMI is 44.92.  Patient is keeping diet, cannot exercise, due to bilateral knee pain.  Encouraged ambulation.  Exercise, keep your portions small.     -Obstructive sleep apnea.  Stated, that using mask at nighttime.     - H of right carpal tunnel syndrome repair.  Healed, improved.  Follow-up with orthopedic surgery as needed.     -Depression, anxiety.  Follow-up with psychiatry.  Current medications.     - Urinary incontinence.  Protective wear.     -Health maintenance.  Medicare Wellness Annual exam is up to  date.  Declined vaccinations.  GYN exam, mammography declined.     -Dyslipidemia  Continue with the low fat, low cholesterol diet  I recommend Mediterranean diet, which include fish, chicken, vegetables and olive oil  Exercise daily for 30 minutes at least 3 times a week  Continue current medications  Report any side effects, such as,  Muscle ache. Muscle weakness, abdominal pain or discomfort     - H of  Fall episode, s/p ED visit 8/14/2022.   Stable, improved.     -Shortness of breath episodes.  Continue to take diuretics, helping.  Furosemide 40 mg once a day.     - Has unsteady gait, in need for   Walker with wheels and seat     Briana Crenshaw MD   Patient was identified as a fall risk.   Risk prevention instructions provided.

## 2023-07-19 NOTE — PATIENT INSTRUCTIONS

## 2023-07-25 DIAGNOSIS — E55.9 MILD VITAMIN D DEFICIENCY: ICD-10-CM

## 2023-07-25 RX ORDER — ERGOCALCIFEROL 1.25 MG/1
CAPSULE ORAL
Qty: 4 CAPSULE | Refills: 0 | Status: SHIPPED | OUTPATIENT
Start: 2023-07-25

## 2023-08-30 DIAGNOSIS — I10 PRIMARY HYPERTENSION: ICD-10-CM

## 2023-08-30 RX ORDER — METOPROLOL SUCCINATE 50 MG/1
TABLET, EXTENDED RELEASE ORAL
Qty: 30 TABLET | Refills: 3 | Status: SHIPPED | OUTPATIENT
Start: 2023-08-30

## 2023-09-06 PROCEDURE — G0179 MD RECERTIFICATION HHA PT: HCPCS | Performed by: INTERNAL MEDICINE

## 2023-11-05 PROCEDURE — G0179 MD RECERTIFICATION HHA PT: HCPCS | Performed by: INTERNAL MEDICINE

## 2023-11-24 DIAGNOSIS — E03.9 HYPOTHYROIDISM, UNSPECIFIED TYPE: ICD-10-CM

## 2023-11-24 DIAGNOSIS — M10.9 GOUT, UNSPECIFIED: ICD-10-CM

## 2023-11-27 RX ORDER — ALLOPURINOL 300 MG/1
300 TABLET ORAL DAILY
Qty: 90 TABLET | Refills: 3 | Status: SHIPPED | OUTPATIENT
Start: 2023-11-27 | End: 2024-02-25

## 2023-11-27 RX ORDER — LEVOTHYROXINE SODIUM 150 UG/1
150 TABLET ORAL DAILY
Qty: 90 TABLET | Refills: 2 | Status: SHIPPED | OUTPATIENT
Start: 2023-11-27 | End: 2024-04-26 | Stop reason: SDUPTHER

## 2023-11-28 DIAGNOSIS — E78.2 HYPERLIPIDEMIA, MIXED: ICD-10-CM

## 2023-11-28 RX ORDER — ATORVASTATIN CALCIUM 40 MG/1
40 TABLET, FILM COATED ORAL DAILY
Qty: 90 TABLET | Refills: 3 | Status: SHIPPED | OUTPATIENT
Start: 2023-11-28 | End: 2024-04-30

## 2023-11-30 DIAGNOSIS — E11.69 DIABETES MELLITUS TYPE 2 IN OBESE: ICD-10-CM

## 2023-11-30 DIAGNOSIS — E66.9 DIABETES MELLITUS TYPE 2 IN OBESE: ICD-10-CM

## 2023-11-30 RX ORDER — METFORMIN HYDROCHLORIDE 500 MG/1
TABLET ORAL
Qty: 180 TABLET | Refills: 6 | Status: SHIPPED | OUTPATIENT
Start: 2023-11-30

## 2024-02-15 RX ORDER — TEMAZEPAM 7.5 MG/1
7.5 CAPSULE ORAL NIGHTLY
COMMUNITY
Start: 2023-03-14 | End: 2024-02-20 | Stop reason: WASHOUT

## 2024-02-15 RX ORDER — FORMOTEROL FUMARATE DIHYDRATE 20 UG/2ML
2 SOLUTION RESPIRATORY (INHALATION) 2 TIMES DAILY
COMMUNITY
Start: 2023-08-02

## 2024-02-15 RX ORDER — FLUTICASONE PROPIONATE 50 MCG
SPRAY, SUSPENSION (ML) NASAL
COMMUNITY

## 2024-02-15 RX ORDER — BUDESONIDE 0.5 MG/2ML
0.5 INHALANT ORAL 2 TIMES DAILY
COMMUNITY
Start: 2023-08-02

## 2024-02-15 RX ORDER — FOLIC ACID 1 MG/1
TABLET ORAL
COMMUNITY
End: 2024-02-20 | Stop reason: WASHOUT

## 2024-02-15 RX ORDER — LANSOPRAZOLE 30 MG/1
30 CAPSULE, DELAYED RELEASE ORAL DAILY
COMMUNITY
Start: 2023-07-20 | End: 2024-02-20 | Stop reason: WASHOUT

## 2024-02-15 RX ORDER — VALSARTAN 320 MG/1
320 TABLET ORAL
COMMUNITY
Start: 2022-11-30 | End: 2024-02-20 | Stop reason: WASHOUT

## 2024-02-15 RX ORDER — FUROSEMIDE 40 MG/1
40 TABLET ORAL DAILY
COMMUNITY

## 2024-02-15 RX ORDER — CARBOXYMETHYLCELLULOSE SODIUM AND GLYCERIN 5; 9 MG/ML; MG/ML
1 SOLUTION/ DROPS OPHTHALMIC EVERY 12 HOURS PRN
COMMUNITY
Start: 2023-12-05 | End: 2024-02-20 | Stop reason: SDUPTHER

## 2024-02-19 DIAGNOSIS — I10 ESSENTIAL (PRIMARY) HYPERTENSION: ICD-10-CM

## 2024-02-19 RX ORDER — TRIAMTERENE/HYDROCHLOROTHIAZID 37.5-25 MG
1 TABLET ORAL DAILY
Qty: 90 TABLET | Refills: 3 | Status: SHIPPED | OUTPATIENT
Start: 2024-02-19 | End: 2024-05-19

## 2024-02-20 ENCOUNTER — OFFICE VISIT (OUTPATIENT)
Dept: PRIMARY CARE | Facility: CLINIC | Age: 84
End: 2024-02-20
Payer: MEDICARE

## 2024-02-20 VITALS
RESPIRATION RATE: 16 BRPM | DIASTOLIC BLOOD PRESSURE: 76 MMHG | BODY MASS INDEX: 45.5 KG/M2 | WEIGHT: 233 LBS | HEART RATE: 64 BPM | SYSTOLIC BLOOD PRESSURE: 102 MMHG | TEMPERATURE: 97.1 F

## 2024-02-20 DIAGNOSIS — M19.90 ARTHRITIS: ICD-10-CM

## 2024-02-20 DIAGNOSIS — R25.2 MUSCLE CRAMPING: ICD-10-CM

## 2024-02-20 DIAGNOSIS — E55.9 MILD VITAMIN D DEFICIENCY: ICD-10-CM

## 2024-02-20 DIAGNOSIS — E78.2 HYPERLIPIDEMIA, MIXED: ICD-10-CM

## 2024-02-20 DIAGNOSIS — I70.209 DIABETES TYPE 2 WITH ATHEROSCLEROSIS OF ARTERIES OF EXTREMITIES (MULTI): ICD-10-CM

## 2024-02-20 DIAGNOSIS — D50.9 IRON DEFICIENCY ANEMIA, UNSPECIFIED IRON DEFICIENCY ANEMIA TYPE: Primary | ICD-10-CM

## 2024-02-20 DIAGNOSIS — H04.129 DRY EYE SYNDROME, UNSPECIFIED LATERALITY: ICD-10-CM

## 2024-02-20 DIAGNOSIS — E78.5 HYPERLIPIDEMIA, UNSPECIFIED HYPERLIPIDEMIA TYPE: ICD-10-CM

## 2024-02-20 DIAGNOSIS — E53.8 VITAMIN B 12 DEFICIENCY: ICD-10-CM

## 2024-02-20 DIAGNOSIS — E03.9 HYPOTHYROIDISM, UNSPECIFIED TYPE: ICD-10-CM

## 2024-02-20 DIAGNOSIS — I10 PRIMARY HYPERTENSION: ICD-10-CM

## 2024-02-20 DIAGNOSIS — J44.9 CHRONIC OBSTRUCTIVE PULMONARY DISEASE, UNSPECIFIED COPD TYPE (MULTI): ICD-10-CM

## 2024-02-20 DIAGNOSIS — E11.51 DIABETES TYPE 2 WITH ATHEROSCLEROSIS OF ARTERIES OF EXTREMITIES (MULTI): ICD-10-CM

## 2024-02-20 DIAGNOSIS — K21.9 GASTROESOPHAGEAL REFLUX DISEASE, UNSPECIFIED WHETHER ESOPHAGITIS PRESENT: ICD-10-CM

## 2024-02-20 LAB
ERYTHROCYTE [SEDIMENTATION RATE] IN BLOOD BY WESTERGREN METHOD: 57 MM/H (ref 0–30)
MAGNESIUM SERPL-MCNC: 2.03 MG/DL (ref 1.6–2.4)
RHEUMATOID FACT SER NEPH-ACNC: <10 IU/ML (ref 0–15)
URATE SERPL-MCNC: 5.9 MG/DL (ref 2.3–6.7)

## 2024-02-20 PROCEDURE — 84443 ASSAY THYROID STIM HORMONE: CPT | Performed by: INTERNAL MEDICINE

## 2024-02-20 PROCEDURE — 1157F ADVNC CARE PLAN IN RCRD: CPT | Performed by: INTERNAL MEDICINE

## 2024-02-20 PROCEDURE — 86038 ANTINUCLEAR ANTIBODIES: CPT

## 2024-02-20 PROCEDURE — 1160F RVW MEDS BY RX/DR IN RCRD: CPT | Performed by: INTERNAL MEDICINE

## 2024-02-20 PROCEDURE — 3074F SYST BP LT 130 MM HG: CPT | Performed by: INTERNAL MEDICINE

## 2024-02-20 PROCEDURE — 86235 NUCLEAR ANTIGEN ANTIBODY: CPT

## 2024-02-20 PROCEDURE — 99213 OFFICE O/P EST LOW 20 MIN: CPT | Performed by: INTERNAL MEDICINE

## 2024-02-20 PROCEDURE — 86225 DNA ANTIBODY NATIVE: CPT

## 2024-02-20 PROCEDURE — 3078F DIAST BP <80 MM HG: CPT | Performed by: INTERNAL MEDICINE

## 2024-02-20 PROCEDURE — 83036 HEMOGLOBIN GLYCOSYLATED A1C: CPT | Performed by: INTERNAL MEDICINE

## 2024-02-20 PROCEDURE — 85652 RBC SED RATE AUTOMATED: CPT

## 2024-02-20 PROCEDURE — 82306 VITAMIN D 25 HYDROXY: CPT | Performed by: INTERNAL MEDICINE

## 2024-02-20 PROCEDURE — 84550 ASSAY OF BLOOD/URIC ACID: CPT

## 2024-02-20 PROCEDURE — 85025 COMPLETE CBC W/AUTO DIFF WBC: CPT | Performed by: INTERNAL MEDICINE

## 2024-02-20 PROCEDURE — 82607 VITAMIN B-12: CPT | Performed by: INTERNAL MEDICINE

## 2024-02-20 PROCEDURE — 86431 RHEUMATOID FACTOR QUANT: CPT

## 2024-02-20 PROCEDURE — 80061 LIPID PANEL: CPT | Performed by: INTERNAL MEDICINE

## 2024-02-20 PROCEDURE — 83735 ASSAY OF MAGNESIUM: CPT

## 2024-02-20 PROCEDURE — 36415 COLL VENOUS BLD VENIPUNCTURE: CPT

## 2024-02-20 PROCEDURE — 80053 COMPREHEN METABOLIC PANEL: CPT | Performed by: INTERNAL MEDICINE

## 2024-02-20 PROCEDURE — 1125F AMNT PAIN NOTED PAIN PRSNT: CPT | Performed by: INTERNAL MEDICINE

## 2024-02-20 PROCEDURE — 1159F MED LIST DOCD IN RCRD: CPT | Performed by: INTERNAL MEDICINE

## 2024-02-20 PROCEDURE — 1036F TOBACCO NON-USER: CPT | Performed by: INTERNAL MEDICINE

## 2024-02-20 RX ORDER — ESOMEPRAZOLE MAGNESIUM 40 MG/1
40 CAPSULE, DELAYED RELEASE ORAL
Qty: 30 CAPSULE | Refills: 6 | Status: SHIPPED | OUTPATIENT
Start: 2024-02-20 | End: 2024-04-26 | Stop reason: SDUPTHER

## 2024-02-20 RX ORDER — ACETAMINOPHEN 500 MG
TABLET ORAL
Qty: 100 TABLET | Refills: 3 | Status: SHIPPED | OUTPATIENT
Start: 2024-02-20

## 2024-02-20 RX ORDER — OLMESARTAN MEDOXOMIL 40 MG/1
40 TABLET ORAL DAILY
Qty: 30 TABLET | Refills: 6 | Status: SHIPPED | OUTPATIENT
Start: 2024-02-20 | End: 2024-04-26 | Stop reason: SDUPTHER

## 2024-02-20 RX ORDER — EZETIMIBE 10 MG/1
10 TABLET ORAL DAILY
Qty: 30 TABLET | Refills: 11 | Status: SHIPPED | OUTPATIENT
Start: 2024-02-20 | End: 2024-04-26 | Stop reason: SDUPTHER

## 2024-02-20 RX ORDER — CARBOXYMETHYLCELLULOSE SODIUM AND GLYCERIN 5; 9 MG/ML; MG/ML
1 SOLUTION/ DROPS OPHTHALMIC EVERY 12 HOURS PRN
Qty: 5 ML | Refills: 11 | Status: SHIPPED | OUTPATIENT
Start: 2024-02-20

## 2024-02-20 RX ORDER — CICLOPIROX OLAMINE 7.7 MG/G
CREAM TOPICAL
COMMUNITY
Start: 2024-02-12

## 2024-02-20 ASSESSMENT — ENCOUNTER SYMPTOMS
LIGHT-HEADEDNESS: 1
BACK PAIN: 1
ARTHRALGIAS: 1
SLEEP DISTURBANCE: 1

## 2024-02-20 ASSESSMENT — PAIN SCALES - GENERAL: PAINLEVEL: 7

## 2024-02-20 NOTE — PROGRESS NOTES
Subjective    Estephania Fitzpatrick is a 83 y.o. female who presents for  follow up.  Has arthralgia.  Complaining of hands cramping.  Weak.    HPI    This is an 82 years old [place]yanese speaking lady with medical history significant for hypertension, asthma, COPD, hypothyroidism, diabetes diet controlled, obesity, arthralgia, degenerative joint disease, hyperlipidemia, anxiety, depression, urinary incontinence, s/p R carpal tunnel syndrome repair, s/p Hospitalization to [facility] 1/24/2019 to 11/25/2019 for CHF exacerbation, s/p ED visit 8/14/2022, s/p hospitalization for shortness of breath, asthma exacerbation, COVID, COPD exacerbation 11/25/2022 to 11/29/2022.     Patient is  presented for follow up.   Has low back pain, has difficulty to change her position.   Taking medications as directed.     Stated, that has been having low back pain, difficulty to ambulate.     Has been having pain, taking Tylenol.     Stated, the blood glucose is stable, well controlled.  Has no issues with blood pressure control.  Has been having fatigue and malaise.  Depressed.  Has episodes of urinary incontinence.    Review of Systems   Musculoskeletal:  Positive for arthralgias and back pain.   Neurological:  Positive for light-headedness.   Psychiatric/Behavioral:  Positive for sleep disturbance.        Objective        Vitals:    02/20/24 0957   BP: 102/76   Pulse: 64   Resp: 16   Temp: 36.2 °C (97.1 °F)        Physical Exam  HENT:      Head: Normocephalic.   Eyes:      Pupils: Pupils are equal, round, and reactive to light.   Cardiovascular:      Rate and Rhythm: Normal rate and regular rhythm.      Heart sounds: Normal heart sounds.   Pulmonary:      Breath sounds: Normal breath sounds.   Abdominal:      Palpations: Abdomen is soft.   Musculoskeletal:         General: Normal range of motion.      Right lower leg: Edema present.      Left lower leg: Edema present.   Skin:     General: Skin is warm.   Neurological:      General:  No focal deficit present.      Mental Status: She is alert. Mental status is at baseline.   Psychiatric:         Mood and Affect: Mood normal.       Diagnoses and all orders for this visit:  Iron deficiency anemia, unspecified iron deficiency anemia type (Primary)  -     CBC  Primary hypertension  -     olmesartan (BENIcar) 40 mg tablet; Take 1 tablet (40 mg) by mouth once daily.  Hyperlipidemia, unspecified hyperlipidemia type  -     ezetimibe (Zetia) 10 mg tablet; Take 1 tablet (10 mg) by mouth once daily.  -     Comprehensive Metabolic Panel  Gastroesophageal reflux disease, unspecified whether esophagitis present  -     esomeprazole (NexIUM) 40 mg DR capsule; Take 1 capsule (40 mg) by mouth once daily in the morning. Take before meals.  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 (CMS/[facility])  -     Hemoglobin A1C  Hypothyroidism, unspecified type  -     Thyroid Stimulating Hormone  Hyperlipidemia, mixed  -     Lipid Panel  Mild vitamin D deficiency  -     Vitamin D 25-Hydroxy,Total (for eval of Vitamin D levels)  Vitamin B 12 deficiency  -     Vitamin B12  Arthritis  -     Arthritis Panel (CMS)  -     acetaminophen (Tylenol Extra Strength) 500 mg tablet; Take 1 tab as needed for pain every 8 hours  -     LAUREN Panel  Dry eye syndrome, unspecified laterality  -     carboxymethylcellulose-glycern (Refresh Optive) 0.5-0.9 % drops; Administer 1 drop into affected eye(s) every 12 hours if needed (dry eyes).  Muscle cramping  -     Magnesium       - Gastroesophageal reflux disease.  Worse now.  Eat small portions.  Avoid Caffeine, spicy foods, chocolate, alcohol.  Do not wear tight clothes.  Keep last meal before bed time > 3 hours.  Keep head side of the bed elevated at all time.  Resume  Nexium.     -Gout.  Controlled now.  Continue to take allopurinol.  Keep a strict diet.  Avoid meat, legumes, alcohol.     - Diabetes type  2.  Well-controlled now.  Check your blood glucose daily.  Exercise, weight loss, diet.  Continue Januvia 50 mg daily.  Blood work today.     - Hypothyroidism.   Repeat TSH today.  Continue with Synthroid 150 mcg 30 min before breakfast for now.    - HTN.  Your blood pressure is low.  Hold on hydrochlorothiazide.  Stop Furosemide for now.  Exercise, weight loss.  Avoid salt.     - Degenerative joint disease.  Low back pain.  Take Tylenol as needed.  Exercises much as you can.  Consider physical therapy.    Bilateral knee pain.  Due to R knee injection/ DR Kumar.     - H of  hospitalization [facility] for shortness of breath, asthma exacerbation, COVID, COPD exacerbation 11/25/2022 to 11/29/2022.   Avoid infection.  Advised to continue Current therapy.  Discussed with patient about medications administration.     - H of R thigh skin burn 2-3 degree.  Healed.     - Left leg varicosities.  Use compressive stockings daily.     - Spinal stenosis.  Low back pain.  PT, exercise.  Take Tylenol as needed.     -Morbid Obesity with BMI is 45.50  Patient is keeping diet, cannot exercise, due to bilateral knee pain.  Encouraged ambulation.  Exercise, keep your portions small.     -Obstructive sleep apnea.  Stated, that using mask at nighttime.     - H of right carpal tunnel syndrome repair.  Healed, improved.  Follow-up with orthopedic surgery as needed.     -Depression, anxiety.  Follow-up with psychiatry.  Not taking any medications now.       - Urinary incontinence.  Protective wear.     -Health maintenance.  Medicare Wellness Annual exam is up to date.  Declined vaccinations.  GYN exam, mammography declined.     -Dyslipidemia  Continue with the low fat, low cholesterol diet  I recommend Mediterranean diet, which include fish, chicken, vegetables and olive oil  Exercise daily for 30 minutes at least 3 times a week  Continue current medications  Report any side effects, such as,  Muscle ache. Muscle weakness, abdominal  pain or discomfort     - H of  Fall episode, s/p ED visit 8/14/2022.   Stable, improved.     -Shortness of breath episodes.  Continue to take diuretics, helping.  Furosemide 40 mg once a day.     - Has unsteady gait, in need for   Walker with wheels and seat    D/w patient about BW results.  Patient's creatinine is elevated to 2.2.  Cholesterol is 196, LDL is 103.  Hold on diuretics.  Repeat renal function in 2 weeks.      Briana Crenshaw MD

## 2024-02-21 LAB
ANA PATTERN: ABNORMAL
ANA SER QL HEP2 SUBST: POSITIVE
ANA TITR SER IF: ABNORMAL {TITER}
CENTROMERE B AB SER-ACNC: <0.2 AI
CHROMATIN AB SERPL-ACNC: <0.2 AI
DSDNA AB SER-ACNC: <1 IU/ML
ENA JO1 AB SER QL IA: <0.2 AI
ENA RNP AB SER IA-ACNC: <0.2 AI
ENA SCL70 AB SER QL IA: <0.2 AI
ENA SM AB SER IA-ACNC: <0.2 AI
ENA SM+RNP AB SER QL IA: <0.2 AI
ENA SS-A AB SER IA-ACNC: <0.2 AI
ENA SS-B AB SER IA-ACNC: <0.2 AI
RIBOSOMAL P AB SER-ACNC: <0.2 AI

## 2024-03-04 PROCEDURE — G0179 MD RECERTIFICATION HHA PT: HCPCS | Performed by: INTERNAL MEDICINE

## 2024-04-16 ENCOUNTER — OFFICE VISIT (OUTPATIENT)
Dept: PRIMARY CARE | Facility: CLINIC | Age: 84
End: 2024-04-16
Payer: MEDICARE

## 2024-04-16 VITALS
HEART RATE: 64 BPM | TEMPERATURE: 97.3 F | DIASTOLIC BLOOD PRESSURE: 54 MMHG | SYSTOLIC BLOOD PRESSURE: 100 MMHG | HEIGHT: 60 IN | BODY MASS INDEX: 46.33 KG/M2 | RESPIRATION RATE: 16 BRPM | WEIGHT: 236 LBS

## 2024-04-16 DIAGNOSIS — E03.9 HYPOTHYROIDISM, UNSPECIFIED TYPE: ICD-10-CM

## 2024-04-16 DIAGNOSIS — M25.50 ARTHRALGIA, UNSPECIFIED JOINT: ICD-10-CM

## 2024-04-16 DIAGNOSIS — E78.2 HYPERLIPIDEMIA, MIXED: ICD-10-CM

## 2024-04-16 DIAGNOSIS — E11.51 DIABETES TYPE 2 WITH ATHEROSCLEROSIS OF ARTERIES OF EXTREMITIES (MULTI): ICD-10-CM

## 2024-04-16 DIAGNOSIS — R19.7 DIARRHEA, UNSPECIFIED TYPE: Primary | ICD-10-CM

## 2024-04-16 DIAGNOSIS — M25.562 PAIN IN BOTH KNEES, UNSPECIFIED CHRONICITY: ICD-10-CM

## 2024-04-16 DIAGNOSIS — M25.561 PAIN IN BOTH KNEES, UNSPECIFIED CHRONICITY: ICD-10-CM

## 2024-04-16 DIAGNOSIS — Z00.00 ROUTINE GENERAL MEDICAL EXAMINATION AT HEALTH CARE FACILITY: ICD-10-CM

## 2024-04-16 DIAGNOSIS — I70.209 DIABETES TYPE 2 WITH ATHEROSCLEROSIS OF ARTERIES OF EXTREMITIES (MULTI): ICD-10-CM

## 2024-04-16 PROCEDURE — 1036F TOBACCO NON-USER: CPT | Performed by: INTERNAL MEDICINE

## 2024-04-16 PROCEDURE — 1157F ADVNC CARE PLAN IN RCRD: CPT | Performed by: INTERNAL MEDICINE

## 2024-04-16 PROCEDURE — 99213 OFFICE O/P EST LOW 20 MIN: CPT | Performed by: INTERNAL MEDICINE

## 2024-04-16 PROCEDURE — G0439 PPPS, SUBSEQ VISIT: HCPCS | Performed by: INTERNAL MEDICINE

## 2024-04-16 PROCEDURE — 1159F MED LIST DOCD IN RCRD: CPT | Performed by: INTERNAL MEDICINE

## 2024-04-16 PROCEDURE — 1126F AMNT PAIN NOTED NONE PRSNT: CPT | Performed by: INTERNAL MEDICINE

## 2024-04-16 PROCEDURE — 1170F FXNL STATUS ASSESSED: CPT | Performed by: INTERNAL MEDICINE

## 2024-04-16 PROCEDURE — 3078F DIAST BP <80 MM HG: CPT | Performed by: INTERNAL MEDICINE

## 2024-04-16 PROCEDURE — 1160F RVW MEDS BY RX/DR IN RCRD: CPT | Performed by: INTERNAL MEDICINE

## 2024-04-16 PROCEDURE — 3074F SYST BP LT 130 MM HG: CPT | Performed by: INTERNAL MEDICINE

## 2024-04-16 RX ORDER — METRONIDAZOLE 250 MG/1
250 TABLET ORAL 3 TIMES DAILY
Qty: 21 TABLET | Refills: 0 | Status: SHIPPED | OUTPATIENT
Start: 2024-04-16 | End: 2024-04-26 | Stop reason: WASHOUT

## 2024-04-16 RX ORDER — ALLOPURINOL 300 MG/1
TABLET ORAL
COMMUNITY
Start: 2024-04-05 | End: 2024-04-30

## 2024-04-16 ASSESSMENT — ACTIVITIES OF DAILY LIVING (ADL)
PATIENT'S MEMORY ADEQUATE TO SAFELY COMPLETE DAILY ACTIVITIES?: YES
HEARING - LEFT EAR: FUNCTIONAL
HEARING - RIGHT EAR: FUNCTIONAL
PREPARING MEALS: NEEDS ASSISTANCE
WALKS IN HOME: INDEPENDENT
HEARING - LEFT EAR: FUNCTIONAL
JUDGMENT_ADEQUATE_SAFELY_COMPLETE_DAILY_ACTIVITIES: YES
ASSISTIVE_DEVICE: CANE;WALKER
DRESSING YOURSELF: INDEPENDENT
DRESSING YOURSELF: INDEPENDENT
PATIENT'S MEMORY ADEQUATE TO SAFELY COMPLETE DAILY ACTIVITIES?: YES
PILL BOX USED: YES
EATING: INDEPENDENT
USING TRANSPORTATION: TOTAL CARE
TOILETING: INDEPENDENT
FEEDING: INDEPENDENT
TOILETING: INDEPENDENT
JUDGMENT_ADEQUATE_SAFELY_COMPLETE_DAILY_ACTIVITIES: YES
STIL DRIVING: NO
NEEDS ASSISTANCE WITH FOOD: INDEPENDENT
HEARING - RIGHT EAR: FUNCTIONAL
TAKING MEDICATION: INDEPENDENT
ADEQUATE_TO_COMPLETE_ADL: YES
FEEDING YOURSELF: INDEPENDENT
DOING HOUSEWORK: NEEDS ASSISTANCE
ASSISTIVE_DEVICE: CANE;WALKER
USING TELEPHONE: NEEDS ASSISTANCE
JUDGMENT_ADEQUATE_SAFELY_COMPLETE_DAILY_ACTIVITIES: YES
ADEQUATE_TO_COMPLETE_ADL: YES
DRESSING: INDEPENDENT
TOILETING: INDEPENDENT
ADEQUATE_TO_COMPLETE_ADL: YES
BATHING: NEEDS ASSISTANCE
GROCERY SHOPPING: NEEDS ASSISTANCE
FEEDING YOURSELF: INDEPENDENT
BATHING: NEEDS ASSISTANCE
GROOMING: INDEPENDENT
GROOMING: INDEPENDENT
MANAGING FINANCES: INDEPENDENT
WALKS IN HOME: INDEPENDENT
BATHING: NEEDS ASSISTANCE

## 2024-04-16 ASSESSMENT — COLUMBIA-SUICIDE SEVERITY RATING SCALE - C-SSRS
2. HAVE YOU ACTUALLY HAD ANY THOUGHTS OF KILLING YOURSELF?: NO
6. HAVE YOU EVER DONE ANYTHING, STARTED TO DO ANYTHING, OR PREPARED TO DO ANYTHING TO END YOUR LIFE?: NO
6. HAVE YOU EVER DONE ANYTHING, STARTED TO DO ANYTHING, OR PREPARED TO DO ANYTHING TO END YOUR LIFE?: NO
2. HAVE YOU ACTUALLY HAD ANY THOUGHTS OF KILLING YOURSELF?: NO
1. IN THE PAST MONTH, HAVE YOU WISHED YOU WERE DEAD OR WISHED YOU COULD GO TO SLEEP AND NOT WAKE UP?: NO
1. IN THE PAST MONTH, HAVE YOU WISHED YOU WERE DEAD OR WISHED YOU COULD GO TO SLEEP AND NOT WAKE UP?: NO

## 2024-04-16 ASSESSMENT — PATIENT HEALTH QUESTIONNAIRE - PHQ9
2. FEELING DOWN, DEPRESSED OR HOPELESS: SEVERAL DAYS
1. LITTLE INTEREST OR PLEASURE IN DOING THINGS: NOT AT ALL
10. IF YOU CHECKED OFF ANY PROBLEMS, HOW DIFFICULT HAVE THESE PROBLEMS MADE IT FOR YOU TO DO YOUR WORK, TAKE CARE OF THINGS AT HOME, OR GET ALONG WITH OTHER PEOPLE: SOMEWHAT DIFFICULT
SUM OF ALL RESPONSES TO PHQ9 QUESTIONS 1 AND 2: 2
1. LITTLE INTEREST OR PLEASURE IN DOING THINGS: SEVERAL DAYS
10. IF YOU CHECKED OFF ANY PROBLEMS, HOW DIFFICULT HAVE THESE PROBLEMS MADE IT FOR YOU TO DO YOUR WORK, TAKE CARE OF THINGS AT HOME, OR GET ALONG WITH OTHER PEOPLE: SOMEWHAT DIFFICULT
2. FEELING DOWN, DEPRESSED OR HOPELESS: NOT AT ALL
SUM OF ALL RESPONSES TO PHQ9 QUESTIONS 1 AND 2: 0

## 2024-04-16 ASSESSMENT — PAIN SCALES - GENERAL: PAINLEVEL: 0-NO PAIN

## 2024-04-16 ASSESSMENT — ENCOUNTER SYMPTOMS
ABDOMINAL DISTENTION: 0
ABDOMINAL PAIN: 0
DIARRHEA: 1
ARTHRALGIAS: 1
DEPRESSION: 1
ACTIVITY CHANGE: 0
COUGH: 0
OCCASIONAL FEELINGS OF UNSTEADINESS: 0
WEAKNESS: 1
LOSS OF SENSATION IN FEET: 0
FATIGUE: 1
SHORTNESS OF BREATH: 0
UNEXPECTED WEIGHT CHANGE: 0
APPETITE CHANGE: 0
BACK PAIN: 1

## 2024-04-16 ASSESSMENT — ANXIETY QUESTIONNAIRES
5. BEING SO RESTLESS THAT IT IS HARD TO SIT STILL: NOT AT ALL
1. FEELING NERVOUS, ANXIOUS, OR ON EDGE: SEVERAL DAYS
GAD7 TOTAL SCORE: 3
4. TROUBLE RELAXING: NOT AT ALL
7. FEELING AFRAID AS IF SOMETHING AWFUL MIGHT HAPPEN: NOT AT ALL
3. WORRYING TOO MUCH ABOUT DIFFERENT THINGS: SEVERAL DAYS
IF YOU CHECKED OFF ANY PROBLEMS ON THIS QUESTIONNAIRE, HOW DIFFICULT HAVE THESE PROBLEMS MADE IT FOR YOU TO DO YOUR WORK, TAKE CARE OF THINGS AT HOME, OR GET ALONG WITH OTHER PEOPLE: SOMEWHAT DIFFICULT
2. NOT BEING ABLE TO STOP OR CONTROL WORRYING: SEVERAL DAYS
6. BECOMING EASILY ANNOYED OR IRRITABLE: NOT AT ALL

## 2024-04-16 ASSESSMENT — GERIATRIC MINI NUTRITIONAL ASSESSMENT (MNA)
A HAS FOOD INTAKE DECLINED OVER THE PAST 3 MONTHS DUE TO LOSS OF APPETITE, DIGESTIVE PROBLEMS, CHEWING OR SWALLOWING DIFFICULTIES?: NO DECREASE IN FOOD INTAKE
C GENERAL MOBILITY: GOES OUT
B WEIGHT LOSS DURING THE LAST 3 MONTHS: NO WEIGHT LOSS
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
## 2024-04-26 ENCOUNTER — TELEMEDICINE (OUTPATIENT)
Dept: PRIMARY CARE | Facility: CLINIC | Age: 84
End: 2024-04-26
Payer: MEDICARE

## 2024-04-26 DIAGNOSIS — K21.9 GASTROESOPHAGEAL REFLUX DISEASE, UNSPECIFIED WHETHER ESOPHAGITIS PRESENT: ICD-10-CM

## 2024-04-26 DIAGNOSIS — I10 PRIMARY HYPERTENSION: ICD-10-CM

## 2024-04-26 DIAGNOSIS — I70.209 DIABETES TYPE 2 WITH ATHEROSCLEROSIS OF ARTERIES OF EXTREMITIES (MULTI): ICD-10-CM

## 2024-04-26 DIAGNOSIS — J45.901 ASTHMA WITH ACUTE EXACERBATION, UNSPECIFIED ASTHMA SEVERITY, UNSPECIFIED WHETHER PERSISTENT (HHS-HCC): Primary | ICD-10-CM

## 2024-04-26 DIAGNOSIS — E78.5 HYPERLIPIDEMIA, UNSPECIFIED HYPERLIPIDEMIA TYPE: ICD-10-CM

## 2024-04-26 DIAGNOSIS — E03.9 HYPOTHYROIDISM, UNSPECIFIED TYPE: ICD-10-CM

## 2024-04-26 DIAGNOSIS — E11.51 DIABETES TYPE 2 WITH ATHEROSCLEROSIS OF ARTERIES OF EXTREMITIES (MULTI): ICD-10-CM

## 2024-04-26 PROCEDURE — 99442 PR PHYS/QHP TELEPHONE EVALUATION 11-20 MIN: CPT | Performed by: INTERNAL MEDICINE

## 2024-04-26 PROCEDURE — 1159F MED LIST DOCD IN RCRD: CPT | Performed by: INTERNAL MEDICINE

## 2024-04-26 PROCEDURE — 1160F RVW MEDS BY RX/DR IN RCRD: CPT | Performed by: INTERNAL MEDICINE

## 2024-04-26 PROCEDURE — 1157F ADVNC CARE PLAN IN RCRD: CPT | Performed by: INTERNAL MEDICINE

## 2024-04-26 RX ORDER — OLMESARTAN MEDOXOMIL 40 MG/1
40 TABLET ORAL DAILY
Qty: 30 TABLET | Refills: 6 | Status: SHIPPED | OUTPATIENT
Start: 2024-04-26 | End: 2024-05-26

## 2024-04-26 RX ORDER — AMOXICILLIN AND CLAVULANATE POTASSIUM 875; 125 MG/1; MG/1
875 TABLET, FILM COATED ORAL 2 TIMES DAILY
Qty: 14 TABLET | Refills: 0 | Status: SHIPPED | OUTPATIENT
Start: 2024-04-26 | End: 2024-05-03

## 2024-04-26 RX ORDER — EZETIMIBE 10 MG/1
10 TABLET ORAL DAILY
Qty: 30 TABLET | Refills: 11 | Status: SHIPPED | OUTPATIENT
Start: 2024-04-26 | End: 2024-05-26

## 2024-04-26 RX ORDER — LEVOTHYROXINE SODIUM 150 UG/1
150 TABLET ORAL DAILY
Qty: 90 TABLET | Refills: 2 | Status: SHIPPED | OUTPATIENT
Start: 2024-04-26 | End: 2024-04-30

## 2024-04-26 RX ORDER — PREDNISONE 20 MG/1
TABLET ORAL
Qty: 30 TABLET | Refills: 0 | Status: SHIPPED | OUTPATIENT
Start: 2024-04-26

## 2024-04-26 RX ORDER — ESOMEPRAZOLE MAGNESIUM 40 MG/1
40 CAPSULE, DELAYED RELEASE ORAL
Qty: 30 CAPSULE | Refills: 6 | Status: SHIPPED | OUTPATIENT
Start: 2024-04-26 | End: 2024-05-26

## 2024-04-26 ASSESSMENT — ENCOUNTER SYMPTOMS
ACTIVITY CHANGE: 1
FEVER: 1
WHEEZING: 1
COUGH: 1

## 2024-04-26 NOTE — PROGRESS NOTES
Subjective    Estephania Fitzpatrick is a 84 y.o. female who is evaluated Via Telemedicine.  Chief complaint is cough, wheezing.    HPI    This is an 84 years old Maltese speaking lady with medical history significant for hypertension, asthma, COPD, hypothyroidism, diabetes diet controlled, obesity, arthralgia, degenerative joint disease, hyperlipidemia, anxiety, depression, urinary incontinence, s/p R carpal tunnel syndrome repair, s/p Hospitalization to [facility] 1/24/2019 to 11/25/2019 for CHF exacerbation, s/p ED visit 8/14/2022, s/p hospitalization for shortness of breath, asthma exacerbation, COVID, COPD exacerbation 11/25/2022 to 11/29/2022.     Patient is  evaluated Via Telemedicine.  Developed cough,wheezing.  Had episode of high fever last night.  Stated, that has been having low back pain, difficulty to ambulate.     Has been having pain, taking Tylenol.     Stated, the blood glucose is stable, well controlled.  Has no issues with blood pressure control.  Has been having fatigue and malaise.  Depressed.  Has episodes of urinary incontinence.    Review of Systems   Constitutional:  Positive for activity change and fever.   Respiratory:  Positive for cough and wheezing.        Objective      There were no vitals filed for this visit.     Physical Exam  Diagnoses and all orders for this visit:  Asthma with acute exacerbation, unspecified asthma severity, unspecified whether persistent ([facility]) (Primary)  -     amoxicillin-pot clavulanate (Augmentin) 875-125 mg tablet; Take 1 tablet (875 mg) by mouth 2 times a day for 7 days.  -     predniSONE (Deltasone) 20 mg tablet; Take 3 tab with food for 3 days, then take 2 tab with food for 3 days, then take 1 tab with food for 3 days, then 1/2 tab with food for 3 days, then stop  Primary hypertension  -     olmesartan (BENIcar) 40 mg tablet; Take 1 tablet (40 mg) by mouth once daily.  Hypothyroidism, unspecified type  -     levothyroxine (Synthroid, Levoxyl) 150  mcg tablet; Take 1 tablet (150 mcg) by mouth once daily.  Hyperlipidemia, unspecified hyperlipidemia type  -     ezetimibe (Zetia) 10 mg tablet; Take 1 tablet (10 mg) by mouth once daily.  Gastroesophageal reflux disease, unspecified whether esophagitis present  -     esomeprazole (NexIUM) 40 mg DR capsule; Take 1 capsule (40 mg) by mouth once daily in the morning. Take before meals.  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       This visit was completed via telephone due to the restrictions of the COVID -19 pandemic.  All issues as above were discussed and addressed, but no physical exam was performed.  If it was felt, that the patient should be evaluated in clinic, then they were directed there.  The patient verbally consented to visit.    Total time spent with patient is greater than 16 minutes, more than 50% of the time is spent in direct patient telemedicine, patient consultation and coordination of care.  Patient voiced a full understanding of all treatment plans.  All patient questions has been answered to patient's satisfaction.     Briana Crenshaw MD

## 2024-06-18 ENCOUNTER — APPOINTMENT (OUTPATIENT)
Dept: PRIMARY CARE | Facility: CLINIC | Age: 84
End: 2024-06-18
Payer: MEDICARE

## 2024-06-24 ENCOUNTER — APPOINTMENT (OUTPATIENT)
Dept: PRIMARY CARE | Facility: CLINIC | Age: 84
End: 2024-06-24
Payer: MEDICARE

## 2024-06-24 VITALS
BODY MASS INDEX: 46.09 KG/M2 | DIASTOLIC BLOOD PRESSURE: 62 MMHG | HEART RATE: 74 BPM | WEIGHT: 236 LBS | RESPIRATION RATE: 16 BRPM | SYSTOLIC BLOOD PRESSURE: 122 MMHG

## 2024-06-24 DIAGNOSIS — I70.209 DIABETES TYPE 2 WITH ATHEROSCLEROSIS OF ARTERIES OF EXTREMITIES (MULTI): ICD-10-CM

## 2024-06-24 DIAGNOSIS — I10 PRIMARY HYPERTENSION: ICD-10-CM

## 2024-06-24 DIAGNOSIS — E78.5 HYPERLIPIDEMIA, UNSPECIFIED HYPERLIPIDEMIA TYPE: ICD-10-CM

## 2024-06-24 DIAGNOSIS — E11.51 DIABETES TYPE 2 WITH ATHEROSCLEROSIS OF ARTERIES OF EXTREMITIES (MULTI): ICD-10-CM

## 2024-06-24 DIAGNOSIS — I10 ESSENTIAL (PRIMARY) HYPERTENSION: ICD-10-CM

## 2024-06-24 DIAGNOSIS — K21.9 GASTROESOPHAGEAL REFLUX DISEASE, UNSPECIFIED WHETHER ESOPHAGITIS PRESENT: ICD-10-CM

## 2024-06-24 PROCEDURE — 1157F ADVNC CARE PLAN IN RCRD: CPT | Performed by: INTERNAL MEDICINE

## 2024-06-24 PROCEDURE — 1036F TOBACCO NON-USER: CPT | Performed by: INTERNAL MEDICINE

## 2024-06-24 PROCEDURE — 99213 OFFICE O/P EST LOW 20 MIN: CPT | Performed by: INTERNAL MEDICINE

## 2024-06-24 PROCEDURE — 1159F MED LIST DOCD IN RCRD: CPT | Performed by: INTERNAL MEDICINE

## 2024-06-24 PROCEDURE — 3074F SYST BP LT 130 MM HG: CPT | Performed by: INTERNAL MEDICINE

## 2024-06-24 PROCEDURE — 3078F DIAST BP <80 MM HG: CPT | Performed by: INTERNAL MEDICINE

## 2024-06-24 PROCEDURE — 1160F RVW MEDS BY RX/DR IN RCRD: CPT | Performed by: INTERNAL MEDICINE

## 2024-06-24 RX ORDER — TRIAMTERENE/HYDROCHLOROTHIAZID 37.5-25 MG
1 TABLET ORAL DAILY
Qty: 90 TABLET | Refills: 3 | Status: SHIPPED | OUTPATIENT
Start: 2024-06-24 | End: 2024-09-22

## 2024-06-24 RX ORDER — OLMESARTAN MEDOXOMIL 40 MG/1
40 TABLET ORAL DAILY
Qty: 30 TABLET | Refills: 6 | Status: SHIPPED | OUTPATIENT
Start: 2024-06-24 | End: 2024-07-24

## 2024-06-24 RX ORDER — ESOMEPRAZOLE MAGNESIUM 40 MG/1
40 CAPSULE, DELAYED RELEASE ORAL
Qty: 30 CAPSULE | Refills: 6 | Status: SHIPPED | OUTPATIENT
Start: 2024-06-24 | End: 2024-07-24

## 2024-06-24 RX ORDER — EZETIMIBE 10 MG/1
10 TABLET ORAL DAILY
Qty: 30 TABLET | Refills: 11 | Status: SHIPPED | OUTPATIENT
Start: 2024-06-24 | End: 2024-07-24

## 2024-06-24 ASSESSMENT — ENCOUNTER SYMPTOMS
UNEXPECTED WEIGHT CHANGE: 0
LOSS OF SENSATION IN FEET: 1
APPETITE CHANGE: 0
OCCASIONAL FEELINGS OF UNSTEADINESS: 1
ACTIVITY CHANGE: 0
FATIGUE: 1
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
## 2024-07-03 PROCEDURE — G0180 MD CERTIFICATION HHA PATIENT: HCPCS | Performed by: INTERNAL MEDICINE

## 2024-07-19 PROBLEM — F33.9 DEPRESSION, RECURRENT (CMS-HCC): Status: RESOLVED | Noted: 2023-04-06 | Resolved: 2024-07-19

## 2024-07-25 ENCOUNTER — APPOINTMENT (OUTPATIENT)
Dept: PRIMARY CARE | Facility: CLINIC | Age: 84
End: 2024-07-25
Payer: MEDICARE

## 2024-07-25 VITALS
SYSTOLIC BLOOD PRESSURE: 118 MMHG | WEIGHT: 233 LBS | DIASTOLIC BLOOD PRESSURE: 76 MMHG | HEART RATE: 62 BPM | RESPIRATION RATE: 16 BRPM | BODY MASS INDEX: 48.91 KG/M2 | TEMPERATURE: 97 F | HEIGHT: 58 IN

## 2024-07-25 DIAGNOSIS — E78.2 HYPERLIPIDEMIA, MIXED: ICD-10-CM

## 2024-07-25 DIAGNOSIS — I70.209 DIABETES TYPE 2 WITH ATHEROSCLEROSIS OF ARTERIES OF EXTREMITIES (MULTI): Primary | ICD-10-CM

## 2024-07-25 DIAGNOSIS — R39.15 URGENCY OF URINATION: ICD-10-CM

## 2024-07-25 DIAGNOSIS — D50.9 IRON DEFICIENCY ANEMIA, UNSPECIFIED IRON DEFICIENCY ANEMIA TYPE: ICD-10-CM

## 2024-07-25 DIAGNOSIS — E53.8 VITAMIN B 12 DEFICIENCY: ICD-10-CM

## 2024-07-25 DIAGNOSIS — E11.51 DIABETES TYPE 2 WITH ATHEROSCLEROSIS OF ARTERIES OF EXTREMITIES (MULTI): Primary | ICD-10-CM

## 2024-07-25 DIAGNOSIS — E03.9 HYPOTHYROIDISM, UNSPECIFIED TYPE: ICD-10-CM

## 2024-07-25 DIAGNOSIS — M25.50 ARTHRALGIA, UNSPECIFIED JOINT: ICD-10-CM

## 2024-07-25 DIAGNOSIS — M19.90 ARTHRITIS: ICD-10-CM

## 2024-07-25 DIAGNOSIS — E55.9 MILD VITAMIN D DEFICIENCY: ICD-10-CM

## 2024-07-25 DIAGNOSIS — K59.00 CONSTIPATION, UNSPECIFIED CONSTIPATION TYPE: ICD-10-CM

## 2024-07-25 LAB
APPEARANCE UR: CLEAR
BILIRUB UR QL STRIP: NEGATIVE
CCP IGG SERPL-ACNC: <1 U/ML
CENTROMERE B AB SER-ACNC: <0.2 AI
CHROMATIN AB SERPL-ACNC: <0.2 AI
COLOR UR: YELLOW
CREAT UR STRIP-MCNC: 100 MG/DL
CRP SERPL-MCNC: 0.29 MG/DL
DSDNA AB SER-ACNC: <1 IU/ML
ENA JO1 AB SER QL IA: <0.2 AI
ENA RNP AB SER IA-ACNC: <0.2 AI
ENA SCL70 AB SER QL IA: <0.2 AI
ENA SM AB SER IA-ACNC: <0.2 AI
ENA SM+RNP AB SER QL IA: <0.2 AI
ENA SS-A AB SER IA-ACNC: <0.2 AI
ENA SS-B AB SER IA-ACNC: <0.2 AI
ERYTHROCYTE [SEDIMENTATION RATE] IN BLOOD BY WESTERGREN METHOD: 66 MM/H (ref 0–30)
GLUCOSE UR STRIP-MCNC: NEGATIVE MG/DL
HGB UR QL STRIP: NEGATIVE
KETONES UR STRIP-MCNC: NEGATIVE MG/DL
LEUKOCYTE ESTERASE UR QL STRIP: ABNORMAL
MICROALBUMIN UR TEST STR-MCNC: 10 MG/L
NITRITE UR QL STRIP: NEGATIVE
PH UR STRIP: 5.5 [PH]
PROT UR STRIP-MCNC: NEGATIVE MG/DL
PROT/CREAT UR: <30 UG/MG CREAT
RHEUMATOID FACT SER NEPH-ACNC: <10 IU/ML (ref 0–15)
RIBOSOMAL P AB SER-ACNC: <0.2 AI
SP GR UR STRIP.AUTO: 1.01
URATE SERPL-MCNC: 6.9 MG/DL (ref 2.3–6.7)
UROBILINOGEN UR STRIP-ACNC: 0.2 E.U./DL

## 2024-07-25 PROCEDURE — 87086 URINE CULTURE/COLONY COUNT: CPT

## 2024-07-25 PROCEDURE — 86140 C-REACTIVE PROTEIN: CPT

## 2024-07-25 PROCEDURE — 86038 ANTINUCLEAR ANTIBODIES: CPT

## 2024-07-25 PROCEDURE — 86225 DNA ANTIBODY NATIVE: CPT

## 2024-07-25 PROCEDURE — 84550 ASSAY OF BLOOD/URIC ACID: CPT

## 2024-07-25 PROCEDURE — 36415 COLL VENOUS BLD VENIPUNCTURE: CPT

## 2024-07-25 PROCEDURE — 86235 NUCLEAR ANTIGEN ANTIBODY: CPT

## 2024-07-25 PROCEDURE — 85652 RBC SED RATE AUTOMATED: CPT

## 2024-07-25 PROCEDURE — 86200 CCP ANTIBODY: CPT

## 2024-07-25 PROCEDURE — 86431 RHEUMATOID FACTOR QUANT: CPT

## 2024-07-25 ASSESSMENT — ENCOUNTER SYMPTOMS
LIGHT-HEADEDNESS: 1
UNEXPECTED WEIGHT CHANGE: 0
ARTHRALGIAS: 1
COLOR CHANGE: 1
APPETITE CHANGE: 1
HEADACHES: 1
BACK PAIN: 1
FATIGUE: 1
ACTIVITY CHANGE: 1
ABDOMINAL DISTENTION: 0
CONSTIPATION: 1

## 2024-07-25 NOTE — LETTER
July 25, 2024     Patient: Estephania Fitzpatrick   YOB: 1940   Date of Visit: 7/25/2024       To Whom It May Concern:    Estephania Fitzpatrick was seen in my clinic on 7/25/2024 at 9:45 am.    She has multiple medical issues, including asthma, COPD, severe arthritis.  Patient will greatly benefit to have apartment with [facility] and looking at [facility].    If you have any questions or concerns, please don't hesitate to call.         Sincerely,         Briana Crenshaw MD        CC: No Recipients
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
## 2024-07-26 DIAGNOSIS — E55.9 MILD VITAMIN D DEFICIENCY: Primary | ICD-10-CM

## 2024-07-26 RX ORDER — ERGOCALCIFEROL 1.25 MG/1
50000 CAPSULE ORAL
Qty: 4 CAPSULE | Refills: 2 | Status: SHIPPED | OUTPATIENT
Start: 2024-07-28 | End: 2024-10-20

## 2024-07-27 LAB — BACTERIA UR CULT: NORMAL

## 2024-07-29 ENCOUNTER — TELEPHONE (OUTPATIENT)
Dept: PRIMARY CARE | Facility: CLINIC | Age: 84
End: 2024-07-29
Payer: MEDICARE

## 2024-08-02 DIAGNOSIS — M19.90 UNSPECIFIED OSTEOARTHRITIS, UNSPECIFIED SITE: ICD-10-CM

## 2024-08-02 RX ORDER — ACETAMINOPHEN 500 MG
TABLET ORAL
Qty: 100 TABLET | Refills: 0 | Status: SHIPPED | OUTPATIENT
Start: 2024-08-02

## 2024-08-28 ENCOUNTER — TELEPHONE (OUTPATIENT)
Dept: PRIMARY CARE | Facility: CLINIC | Age: 84
End: 2024-08-28
Payer: MEDICARE

## 2024-08-31 PROCEDURE — G0179 MD RECERTIFICATION HHA PT: HCPCS | Performed by: INTERNAL MEDICINE

## 2024-09-03 DIAGNOSIS — M19.90 UNSPECIFIED OSTEOARTHRITIS, UNSPECIFIED SITE: ICD-10-CM

## 2024-09-03 RX ORDER — ACETAMINOPHEN 500 MG
TABLET ORAL
Qty: 100 TABLET | Refills: 6 | Status: SHIPPED | OUTPATIENT
Start: 2024-09-03

## 2024-09-06 DIAGNOSIS — I10 PRIMARY HYPERTENSION: ICD-10-CM

## 2024-09-06 RX ORDER — METOPROLOL SUCCINATE 50 MG/1
TABLET, EXTENDED RELEASE ORAL
Qty: 30 TABLET | Refills: 6 | Status: SHIPPED | OUTPATIENT
Start: 2024-09-06

## 2024-10-30 PROCEDURE — G0179 MD RECERTIFICATION HHA PT: HCPCS | Performed by: INTERNAL MEDICINE

## 2024-10-31 DIAGNOSIS — E55.9 MILD VITAMIN D DEFICIENCY: ICD-10-CM

## 2024-10-31 DIAGNOSIS — E03.9 HYPOTHYROIDISM, UNSPECIFIED TYPE: ICD-10-CM

## 2024-11-01 RX ORDER — ERGOCALCIFEROL 1.25 MG/1
1 CAPSULE ORAL
Qty: 4 CAPSULE | Refills: 2 | Status: SHIPPED | OUTPATIENT
Start: 2024-11-03

## 2024-11-01 RX ORDER — LEVOTHYROXINE SODIUM 150 UG/1
150 TABLET ORAL DAILY
Qty: 90 TABLET | Refills: 6 | Status: SHIPPED | OUTPATIENT
Start: 2024-11-01

## 2024-11-13 ENCOUNTER — APPOINTMENT (OUTPATIENT)
Dept: PRIMARY CARE | Facility: CLINIC | Age: 84
End: 2024-11-13
Payer: MEDICARE

## 2024-11-13 ENCOUNTER — HOSPITAL ENCOUNTER (OUTPATIENT)
Dept: RADIOLOGY | Facility: CLINIC | Age: 84
Discharge: HOME | End: 2024-11-13
Payer: COMMERCIAL

## 2024-11-13 VITALS
HEIGHT: 58 IN | HEART RATE: 64 BPM | DIASTOLIC BLOOD PRESSURE: 62 MMHG | TEMPERATURE: 97.7 F | SYSTOLIC BLOOD PRESSURE: 100 MMHG | RESPIRATION RATE: 16 BRPM | BODY MASS INDEX: 50.8 KG/M2 | WEIGHT: 242 LBS

## 2024-11-13 DIAGNOSIS — E78.2 HYPERLIPIDEMIA, MIXED: ICD-10-CM

## 2024-11-13 DIAGNOSIS — D50.9 IRON DEFICIENCY ANEMIA, UNSPECIFIED IRON DEFICIENCY ANEMIA TYPE: ICD-10-CM

## 2024-11-13 DIAGNOSIS — E53.8 VITAMIN B 12 DEFICIENCY: ICD-10-CM

## 2024-11-13 DIAGNOSIS — M25.50 ARTHRALGIA, UNSPECIFIED JOINT: ICD-10-CM

## 2024-11-13 DIAGNOSIS — I70.209 DIABETES TYPE 2 WITH ATHEROSCLEROSIS OF ARTERIES OF EXTREMITIES: ICD-10-CM

## 2024-11-13 DIAGNOSIS — J45.901 ASTHMA WITH ACUTE EXACERBATION, UNSPECIFIED ASTHMA SEVERITY, UNSPECIFIED WHETHER PERSISTENT (HHS-HCC): ICD-10-CM

## 2024-11-13 DIAGNOSIS — E11.51 DIABETES TYPE 2 WITH ATHEROSCLEROSIS OF ARTERIES OF EXTREMITIES: ICD-10-CM

## 2024-11-13 DIAGNOSIS — R05.9 COUGH, UNSPECIFIED TYPE: ICD-10-CM

## 2024-11-13 DIAGNOSIS — E78.5 HYPERLIPIDEMIA, UNSPECIFIED HYPERLIPIDEMIA TYPE: ICD-10-CM

## 2024-11-13 DIAGNOSIS — E03.9 HYPOTHYROIDISM, UNSPECIFIED TYPE: ICD-10-CM

## 2024-11-13 DIAGNOSIS — R39.15 URGENCY OF URINATION: ICD-10-CM

## 2024-11-13 DIAGNOSIS — E55.9 MILD VITAMIN D DEFICIENCY: ICD-10-CM

## 2024-11-13 DIAGNOSIS — J44.9 CHRONIC OBSTRUCTIVE PULMONARY DISEASE, UNSPECIFIED COPD TYPE (MULTI): Primary | ICD-10-CM

## 2024-11-13 LAB
25(OH)D3 SERPL-MCNC: 34 NG/ML (ref 30–100)
ALBUMIN SERPL BCP-MCNC: 3.8 G/DL (ref 3.4–5)
ALP SERPL-CCNC: 121 U/L (ref 45–117)
ALT SERPL W P-5'-P-CCNC: 27 U/L (ref 16–63)
ANION GAP SERPL CALC-SCNC: 16 MMOL/L (ref 10–20)
APPEARANCE UR: CLEAR
AST SERPL W P-5'-P-CCNC: 19 U/L (ref 15–37)
BASOPHILS # BLD AUTO: 0.03 X10*3/UL (ref 0.1–1.6)
BASOPHILS NFR BLD AUTO: 0.36 % (ref 0–0.3)
BILIRUB SERPL-MCNC: 0.6 MG/DL (ref 0.2–1)
BILIRUB UR QL STRIP: NEGATIVE
BUN SERPL-MCNC: 39 MG/DL (ref 7–18)
CALCIUM SERPL-MCNC: 9.2 MG/DL (ref 8.5–10.1)
CHLORIDE SERPL-SCNC: 102 MMOL/L (ref 98–107)
CHOLEST SERPL-MCNC: 164 MG/DL (ref 0–199)
CHOLESTEROL/HDL RATIO: 2.3 (ref 4.2–7)
CO2 SERPL-SCNC: 30 MMOL/L (ref 21–32)
COLOR UR: YELLOW
CREAT SERPL-MCNC: 1.56 MG/DL (ref 0.6–1.1)
CREAT UR STRIP-MCNC: 50 MG/DL
EGFRCR SERPLBLD CKD-EPI 2021: 33 ML/MIN/1.73M*2
EOSINOPHIL # BLD AUTO: 0.16 X10*3/UL (ref 0.04–0.5)
EOSINOPHIL NFR BLD AUTO: 2.21 % (ref 0.7–7)
ERYTHROCYTE [DISTWIDTH] IN BLOOD BY AUTOMATED COUNT: 15 % (ref 11.5–14.5)
GLUCOSE SERPL-MCNC: 105 MG/DL (ref 74–100)
GLUCOSE UR STRIP-MCNC: NEGATIVE MG/DL
HBA1C MFR BLD: 6.2 %
HCT VFR BLD AUTO: 40.9 % (ref 36.6–46.6)
HDLC SERPL-MCNC: 72 MG/DL (ref 40–59)
HGB BLD-MCNC: 13.34 G/DL (ref 12–15.4)
HGB UR QL STRIP: NEGATIVE
IS PATIENT FASTING: YES
KETONES UR STRIP-MCNC: NEGATIVE MG/DL
LDLC SERPL DIRECT ASSAY-MCNC: 76 MG/DL (ref 0–100)
LEUKOCYTE ESTERASE UR QL STRIP: NEGATIVE
LYMPHOCYTES # BLD AUTO: 1.55 X10*3/UL (ref 0–6)
LYMPHOCYTES NFR BLD AUTO: 20.86 % (ref 20.5–51.1)
MCH RBC QN AUTO: 30.8 PG (ref 26–32)
MCHC RBC AUTO-ENTMCNC: 32.6 G/DL (ref 31–38)
MCV RBC AUTO: 94.5 FL (ref 80–96)
MICROALBUMIN UR TEST STR-MCNC: 10 MG/L
MONOCYTES # BLD AUTO: 0.67 X10*3/UL (ref 1.6–24.9)
MONOCYTES NFR BLD AUTO: 9.05 % (ref 1.7–9.3)
NEUTROPHILS # BLD AUTO: 5 X10*3/UL (ref 1.4–6.5)
NEUTROPHILS NFR BLD AUTO: 67.52 % (ref 42.2–75.2)
NITRITE UR QL STRIP: NEGATIVE
PH UR STRIP: 5.5 [PH]
PLATELET # BLD AUTO: 255.2 X10*3/UL (ref 150–450)
PMV BLD AUTO: 8.22 FL (ref 7.8–11)
POTASSIUM SERPL-SCNC: 5.1 MMOL/L (ref 3.5–5.1)
PROT SERPL-MCNC: 6.9 G/DL (ref 6.4–8.2)
PROT UR STRIP-MCNC: NEGATIVE MG/DL
PROT/CREAT UR: <30 UG/MG CREAT
RBC # BLD AUTO: 4.33 X10*6/UL (ref 3.9–5.3)
SODIUM SERPL-SCNC: 143 MMOL/L (ref 136–145)
SP GR UR STRIP.AUTO: 1.01
TRIGL SERPL-MCNC: 85 MG/DL
TSH SERPL-ACNC: 4.4 MIU/L (ref 0.44–3.98)
UROBILINOGEN UR STRIP-ACNC: 0.2 E.U./DL
VIT B12 SERPL-MCNC: 453 PG/ML (ref 193–986)
WBC # BLD AUTO: 7.41 X10*3/UL (ref 4.5–10.5)

## 2024-11-13 PROCEDURE — 80061 LIPID PANEL: CPT | Performed by: INTERNAL MEDICINE

## 2024-11-13 PROCEDURE — 85025 COMPLETE CBC W/AUTO DIFF WBC: CPT | Performed by: INTERNAL MEDICINE

## 2024-11-13 PROCEDURE — 86225 DNA ANTIBODY NATIVE: CPT

## 2024-11-13 PROCEDURE — 3074F SYST BP LT 130 MM HG: CPT | Performed by: INTERNAL MEDICINE

## 2024-11-13 PROCEDURE — 71046 X-RAY EXAM CHEST 2 VIEWS: CPT | Performed by: RADIOLOGY

## 2024-11-13 PROCEDURE — 71046 X-RAY EXAM CHEST 2 VIEWS: CPT

## 2024-11-13 PROCEDURE — 81003 URINALYSIS AUTO W/O SCOPE: CPT | Performed by: INTERNAL MEDICINE

## 2024-11-13 PROCEDURE — 86235 NUCLEAR ANTIGEN ANTIBODY: CPT

## 2024-11-13 PROCEDURE — 1157F ADVNC CARE PLAN IN RCRD: CPT | Performed by: INTERNAL MEDICINE

## 2024-11-13 PROCEDURE — 86431 RHEUMATOID FACTOR QUANT: CPT

## 2024-11-13 PROCEDURE — 84550 ASSAY OF BLOOD/URIC ACID: CPT

## 2024-11-13 PROCEDURE — 82570 ASSAY OF URINE CREATININE: CPT | Mod: CLIA WAIVED TEST | Performed by: INTERNAL MEDICINE

## 2024-11-13 PROCEDURE — 3078F DIAST BP <80 MM HG: CPT | Performed by: INTERNAL MEDICINE

## 2024-11-13 PROCEDURE — 80053 COMPREHEN METABOLIC PANEL: CPT | Performed by: INTERNAL MEDICINE

## 2024-11-13 PROCEDURE — G2211 COMPLEX E/M VISIT ADD ON: HCPCS | Performed by: INTERNAL MEDICINE

## 2024-11-13 PROCEDURE — 1160F RVW MEDS BY RX/DR IN RCRD: CPT | Performed by: INTERNAL MEDICINE

## 2024-11-13 PROCEDURE — 83036 HEMOGLOBIN GLYCOSYLATED A1C: CPT | Performed by: INTERNAL MEDICINE

## 2024-11-13 PROCEDURE — 86038 ANTINUCLEAR ANTIBODIES: CPT

## 2024-11-13 PROCEDURE — 84443 ASSAY THYROID STIM HORMONE: CPT | Performed by: INTERNAL MEDICINE

## 2024-11-13 PROCEDURE — 85652 RBC SED RATE AUTOMATED: CPT

## 2024-11-13 PROCEDURE — 82043 UR ALBUMIN QUANTITATIVE: CPT | Mod: CLIA WAIVED TEST | Performed by: INTERNAL MEDICINE

## 2024-11-13 PROCEDURE — 1159F MED LIST DOCD IN RCRD: CPT | Performed by: INTERNAL MEDICINE

## 2024-11-13 PROCEDURE — 82607 VITAMIN B-12: CPT | Performed by: INTERNAL MEDICINE

## 2024-11-13 PROCEDURE — 82306 VITAMIN D 25 HYDROXY: CPT | Performed by: INTERNAL MEDICINE

## 2024-11-13 PROCEDURE — 99213 OFFICE O/P EST LOW 20 MIN: CPT | Performed by: INTERNAL MEDICINE

## 2024-11-13 RX ORDER — AMOXICILLIN AND CLAVULANATE POTASSIUM 875; 125 MG/1; MG/1
875 TABLET, FILM COATED ORAL 2 TIMES DAILY
Qty: 14 TABLET | Refills: 0 | Status: SHIPPED | OUTPATIENT
Start: 2024-11-13 | End: 2024-11-20

## 2024-11-13 RX ORDER — BENZONATATE 200 MG/1
200 CAPSULE ORAL 3 TIMES DAILY PRN
Qty: 42 CAPSULE | Refills: 3 | Status: SHIPPED | OUTPATIENT
Start: 2024-11-13 | End: 2024-12-13

## 2024-11-13 RX ORDER — ALBUTEROL SULFATE 90 UG/1
2 INHALANT RESPIRATORY (INHALATION) EVERY 4 HOURS PRN
Qty: 8.5 G | Refills: 6 | Status: SHIPPED | OUTPATIENT
Start: 2024-11-13 | End: 2025-11-13

## 2024-11-13 RX ORDER — PREDNISONE 20 MG/1
TABLET ORAL
Qty: 30 TABLET | Refills: 0 | Status: SHIPPED | OUTPATIENT
Start: 2024-11-13

## 2024-11-13 ASSESSMENT — ENCOUNTER SYMPTOMS
BACK PAIN: 1
ARTHRALGIAS: 1
ACTIVITY CHANGE: 1
FATIGUE: 1
SLEEP DISTURBANCE: 1

## 2024-11-13 NOTE — PROGRESS NOTES
"Subjective   Patient ID: Estephania Fitzpatrick is a 84 y.o. female who presents with chief complaint of worsening cough, SOB.  Has arthralgia, malaise.  Having difficulty to ambulate.  Has been having low blood pressure.    HPI     This is an 84 years old Swazi speaking lady with medical history significant for hypertension, asthma, COPD, hypothyroidism, diabetes diet controlled, obesity, arthralgia, degenerative joint disease, hyperlipidemia, anxiety, depression, urinary incontinence, s/p R carpal tunnel syndrome repair, s/p Hospitalization to [facility] 1/24/2019 to 11/25/2019 for CHF exacerbation, s/p ED visit 8/14/2022, s/p hospitalization for shortness of breath, asthma exacerbation, COVID, COPD exacerbation 11/25/2022 to 11/29/2022, s/p ED visit for Fall episode 6/20/2024.     Patient is  presented for evaluation and treatment of the above complaints.  Has been having worsening of her asthma, wheezing, has cough.  Has been having some shortness of breath.     Has constipations.  Blood pressure is better controlled.     Stated, that has been having low back pain, difficulty to ambulate.     Has been having pain, taking Tylenol.     Stated, the blood glucose is stable, well controlled.  Has no issues with blood pressure control.  Has been having fatigue and malaise.  Depressed.  Has episodes of urinary incontinence.     Review of Systems   Constitutional:  Positive for activity change and fatigue.   Musculoskeletal:  Positive for arthralgias, back pain and gait problem.   Psychiatric/Behavioral:  Positive for sleep disturbance.        Objective   /62   Pulse 64   Temp 36.5 °C (97.7 °F) (Temporal)   Resp 16   Ht 1.461 m (4' 9.5\")   Wt 110 kg (242 lb)   BMI 51.46 kg/m²     Physical Exam  Constitutional:       Appearance: She is obese.   HENT:      Head: Normocephalic and atraumatic.   Eyes:      Pupils: Pupils are equal, round, and reactive to light.   Cardiovascular:      Rate and Rhythm: " Regular rhythm.      Heart sounds: Normal heart sounds.   Pulmonary:      Breath sounds: Normal breath sounds.   Musculoskeletal:         General: Swelling and tenderness present.   Skin:     General: Skin is warm.   Neurological:      Mental Status: She is alert. Mental status is at baseline.   Psychiatric:         Mood and Affect: Mood normal.         Assessment/Plan   Problem List Items Addressed This Visit             ICD-10-CM    Anemia D64.9    Relevant Orders    CBC w/5 Part Differential, [facility]    Arthralgia M25.50    Relevant Orders    Arthritis Panel (CMS)    Asthma J45.909    Relevant Medications    predniSONE (Deltasone) 20 mg tablet    amoxicillin-pot clavulanate (Augmentin) 875-125 mg tablet    COPD (chronic obstructive pulmonary disease) (Multi) - Primary J44.9    Relevant Medications    albuterol (ProAir HFA) 90 mcg/actuation inhaler    Cough R05.9    Relevant Medications    benzonatate (Tessalon) 200 mg capsule    Other Relevant Orders    XR chest 2 views    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 E11.51, I70.209    Relevant Orders    Hemoglobin A1C    POCT ALBUMIN RANDOM URINE DOCKED DEVICE    Hyperlipidemia, mixed E78.2    Hypothyroidism E03.9    Relevant Orders    Thyroid Stimulating Hormone    Mild vitamin D deficiency E55.9    Relevant Orders    Vitamin D 25-Hydroxy,Total (for eval of Vitamin D levels)    Vitamin B 12 deficiency E53.8    Relevant Orders    Vitamin B12     Other Visit Diagnoses         Codes    Hyperlipidemia, unspecified hyperlipidemia type     E78.5    Relevant Orders    Comprehensive Metabolic Panel    Lipid Panel    Urgency of urination     R39.15    Relevant Orders    POCT UA (Automated) docked device            Asthma/ COPD exacerbation.  Augmentin, start on steroids.  Trial of Trelegy respiratory therapy.  Use inhalers.  Chest X ray.    Hypotension.  Stop Maxzide.  Continue with Benicar, Furosemide 2-3 times a week.     R leg dermatitis.  Use  moisturizing cream.     S/p ED visit 6/20/2024 for Fall episode.  Has had CT scan done neck, LS spine.  No evidence of Fx.  Gait training.  Tylenol as needed.     - Degenerative joint disease.  Low back pain.  Take Tylenol as needed.  Exercises much as you can.  Consider physical therapy.     Bilateral knee pain.  Due to R knee injection/ DR Kumar.     Gastroesophageal reflux disease.  Worse now.  Eat small portions.  Avoid Caffeine, spicy foods, chocolate, alcohol.  Do not wear tight clothes.  Keep last meal before bed time > 3 hours.  Keep head side of the bed elevated at all time.  Resume  Nexium.     -Gout.  Controlled now.  Continue to take allopurinol.  Keep a strict diet.  Avoid meat, legumes, alcohol.     - Diabetes type 2.  Well-controlled now.  Check your blood glucose daily.  Exercise, weight loss, diet.  Continue Januvia 50 mg daily.  Microalbumin, Hb A1C.     - Hypothyroidism.  Continue with Synthroid 150 mcg 30 min before breakfast for now.    - HTN.   Stable now.   Avoid salt.     - H of  hospitalization [facility] for shortness of breath, asthma exacerbation, COVID, COPD exacerbation 11/25/2022 to 11/29/2022.   Avoid infection.  Advised to continue Current therapy.  Discussed with patient about medications administration.     - H of R thigh skin burn 2-3 degree.  Healed.     - Left leg varicosities.  Use compressive stockings daily.     - Spinal stenosis.  Low back pain.  PT, exercise.  Take Tylenol as needed.      -Obstructive sleep apnea.  Has mask at home, but unable to use it.     - H of right carpal tunnel syndrome repair.  Healed, improved.  Follow-up with orthopedic surgery as needed.     -Depression, anxiety.  Follow-up with psychiatry.  Not taking any medications now.       - Urinary incontinence.  Protective wear.     -Health maintenance.  Medicare Wellness Annual exam is up to date.  Declined vaccinations.  GYN exam, mammography declined.     - H of  Fall episode, s/p ED visit  8/14/2022.   Stable, improved.    Unsteady gait.  Use walker with wheels.     CKD .  Last creatinine 2.2.  Avoid nephrotoxic agents.  Fluids.  Repeat BW today.     Hyperlipidemia.   Keep Mediterranean diet.  Exercise,diet.  Repeat today.    -Morbid Obesity with BMI is 51.46  Patient is keeping diet, cannot exercise, due to bilateral knee pain.  Encouraged ambulation.  Exercise, keep your portions small.

## 2024-11-14 ENCOUNTER — TELEPHONE (OUTPATIENT)
Dept: PRIMARY CARE | Facility: CLINIC | Age: 84
End: 2024-11-14
Payer: MEDICARE

## 2024-11-14 LAB
ANA PATTERN: ABNORMAL
ANA SER QL HEP2 SUBST: POSITIVE
ANA TITR SER IF: ABNORMAL {TITER}
CENTROMERE B AB SER-ACNC: <0.2 AI
CHROMATIN AB SERPL-ACNC: <0.2 AI
DSDNA AB SER-ACNC: <1 IU/ML
ENA JO1 AB SER QL IA: <0.2 AI
ENA RNP AB SER IA-ACNC: <0.2 AI
ENA SCL70 AB SER QL IA: <0.2 AI
ENA SM AB SER IA-ACNC: <0.2 AI
ENA SM+RNP AB SER QL IA: <0.2 AI
ENA SS-A AB SER IA-ACNC: <0.2 AI
ENA SS-B AB SER IA-ACNC: <0.2 AI
ERYTHROCYTE [SEDIMENTATION RATE] IN BLOOD BY WESTERGREN METHOD: 12 MM/H (ref 0–30)
RHEUMATOID FACT SER NEPH-ACNC: <10 IU/ML (ref 0–15)
RIBOSOMAL P AB SER-ACNC: <0.2 AI
URATE SERPL-MCNC: 6.4 MG/DL (ref 2.3–6.7)

## 2024-11-25 DIAGNOSIS — K21.9 GASTRO-ESOPHAGEAL REFLUX DISEASE WITHOUT ESOPHAGITIS: ICD-10-CM

## 2024-11-25 RX ORDER — DEXLANSOPRAZOLE 60 MG/1
CAPSULE, DELAYED RELEASE ORAL
Qty: 30 CAPSULE | Refills: 3 | Status: SHIPPED | OUTPATIENT
Start: 2024-11-25

## 2025-01-06 DIAGNOSIS — E11.51 DIABETES TYPE 2 WITH ATHEROSCLEROSIS OF ARTERIES OF EXTREMITIES: ICD-10-CM

## 2025-01-06 DIAGNOSIS — I70.209 DIABETES TYPE 2 WITH ATHEROSCLEROSIS OF ARTERIES OF EXTREMITIES: ICD-10-CM

## 2025-01-06 DIAGNOSIS — J44.9 CHRONIC OBSTRUCTIVE PULMONARY DISEASE, UNSPECIFIED COPD TYPE (MULTI): ICD-10-CM

## 2025-01-06 RX ORDER — ALBUTEROL SULFATE 90 UG/1
2 INHALANT RESPIRATORY (INHALATION) EVERY 4 HOURS PRN
Qty: 18 G | Refills: 4 | Status: SHIPPED | OUTPATIENT
Start: 2025-01-06 | End: 2026-01-06

## 2025-02-11 DIAGNOSIS — E55.9 MILD VITAMIN D DEFICIENCY: ICD-10-CM

## 2025-02-11 DIAGNOSIS — E78.5 HYPERLIPIDEMIA, UNSPECIFIED HYPERLIPIDEMIA TYPE: ICD-10-CM

## 2025-02-11 RX ORDER — ERGOCALCIFEROL 1.25 MG/1
1 CAPSULE ORAL
Qty: 4 CAPSULE | Refills: 0 | Status: SHIPPED | OUTPATIENT
Start: 2025-02-11

## 2025-02-11 RX ORDER — EZETIMIBE 10 MG/1
10 TABLET ORAL DAILY
Qty: 30 TABLET | Refills: 0 | Status: SHIPPED | OUTPATIENT
Start: 2025-02-11

## 2025-02-26 ENCOUNTER — APPOINTMENT (OUTPATIENT)
Dept: PRIMARY CARE | Facility: CLINIC | Age: 85
End: 2025-02-26
Payer: MEDICARE

## 2025-03-10 DIAGNOSIS — E55.9 MILD VITAMIN D DEFICIENCY: ICD-10-CM

## 2025-03-10 RX ORDER — ERGOCALCIFEROL 1.25 MG/1
1 CAPSULE ORAL
Qty: 4 CAPSULE | Refills: 2 | Status: SHIPPED | OUTPATIENT
Start: 2025-03-10

## 2025-03-12 DIAGNOSIS — E78.5 HYPERLIPIDEMIA, UNSPECIFIED HYPERLIPIDEMIA TYPE: ICD-10-CM

## 2025-03-12 RX ORDER — EZETIMIBE 10 MG/1
10 TABLET ORAL DAILY
Qty: 30 TABLET | Refills: 3 | Status: SHIPPED | OUTPATIENT
Start: 2025-03-12

## 2025-03-17 ENCOUNTER — APPOINTMENT (OUTPATIENT)
Dept: PRIMARY CARE | Facility: CLINIC | Age: 85
End: 2025-03-17
Payer: MEDICARE

## 2025-03-17 VITALS
BODY MASS INDEX: 52.52 KG/M2 | DIASTOLIC BLOOD PRESSURE: 62 MMHG | TEMPERATURE: 97.3 F | HEART RATE: 64 BPM | WEIGHT: 247 LBS | SYSTOLIC BLOOD PRESSURE: 122 MMHG | RESPIRATION RATE: 16 BRPM

## 2025-03-17 DIAGNOSIS — N18.32 STAGE 3B CHRONIC KIDNEY DISEASE (MULTI): ICD-10-CM

## 2025-03-17 DIAGNOSIS — I70.209 DIABETES TYPE 2 WITH ATHEROSCLEROSIS OF ARTERIES OF EXTREMITIES: ICD-10-CM

## 2025-03-17 DIAGNOSIS — E78.2 HYPERLIPIDEMIA, MIXED: ICD-10-CM

## 2025-03-17 DIAGNOSIS — D50.9 IRON DEFICIENCY ANEMIA, UNSPECIFIED IRON DEFICIENCY ANEMIA TYPE: ICD-10-CM

## 2025-03-17 DIAGNOSIS — E55.9 MILD VITAMIN D DEFICIENCY: ICD-10-CM

## 2025-03-17 DIAGNOSIS — J44.1 CHRONIC OBSTRUCTIVE PULMONARY DISEASE WITH (ACUTE) EXACERBATION (MULTI): ICD-10-CM

## 2025-03-17 DIAGNOSIS — E53.8 VITAMIN B 12 DEFICIENCY: Primary | ICD-10-CM

## 2025-03-17 DIAGNOSIS — E03.9 HYPOTHYROIDISM, UNSPECIFIED TYPE: ICD-10-CM

## 2025-03-17 DIAGNOSIS — E11.51 DIABETES TYPE 2 WITH ATHEROSCLEROSIS OF ARTERIES OF EXTREMITIES: ICD-10-CM

## 2025-03-17 LAB
25(OH)D3 SERPL-MCNC: 32 NG/ML (ref 30–100)
ALBUMIN SERPL BCP-MCNC: 3.3 G/DL (ref 3.4–5)
ALP SERPL-CCNC: 101 U/L (ref 45–117)
ALT SERPL W P-5'-P-CCNC: 20 U/L (ref 16–63)
ANION GAP SERPL CALC-SCNC: 12 MMOL/L (ref 10–20)
AST SERPL W P-5'-P-CCNC: 16 U/L (ref 15–37)
BASOPHILS # BLD AUTO: 0.02 X10*3/UL (ref 0.1–1.6)
BASOPHILS NFR BLD AUTO: 0.29 % (ref 0–0.3)
BILIRUB SERPL-MCNC: 0.5 MG/DL (ref 0.2–1)
BUN SERPL-MCNC: 30 MG/DL (ref 7–18)
CALCIUM SERPL-MCNC: 9.1 MG/DL (ref 8.5–10.1)
CHLORIDE SERPL-SCNC: 110 MMOL/L (ref 98–107)
CHOLEST SERPL-MCNC: 201 MG/DL (ref 0–199)
CHOLESTEROL/HDL RATIO: 3.4 (ref 4.2–7)
CO2 SERPL-SCNC: 30 MMOL/L (ref 21–32)
CREAT SERPL-MCNC: 1.29 MG/DL (ref 0.6–1.1)
EGFRCR SERPLBLD CKD-EPI 2021: 42 ML/MIN/1.73M*2
EOSINOPHIL # BLD AUTO: 0.18 X10*3/UL (ref 0.04–0.5)
EOSINOPHIL NFR BLD AUTO: 3.17 % (ref 0.7–7)
ERYTHROCYTE [DISTWIDTH] IN BLOOD BY AUTOMATED COUNT: 14.7 % (ref 11.5–14.5)
GLUCOSE SERPL-MCNC: 109 MG/DL (ref 74–100)
HBA1C MFR BLD: 6.2 %
HCT VFR BLD AUTO: 38.1 % (ref 36.6–46.6)
HDLC SERPL-MCNC: 60 MG/DL (ref 40–59)
HGB BLD-MCNC: 12.73 G/DL (ref 12–15.4)
IS PATIENT FASTING: YES
LDLC SERPL DIRECT ASSAY-MCNC: 116 MG/DL (ref 0–100)
LYMPHOCYTES # BLD AUTO: 1.35 X10*3/UL (ref 0–6)
LYMPHOCYTES NFR BLD AUTO: 23.18 % (ref 20.5–51.1)
MCH RBC QN AUTO: 31.7 PG (ref 26–32)
MCHC RBC AUTO-ENTMCNC: 33.4 G/DL (ref 31–38)
MCV RBC AUTO: 94.7 FL (ref 80–96)
MONOCYTES # BLD AUTO: 0.54 X10*3/UL (ref 1.6–24.9)
MONOCYTES NFR BLD AUTO: 9.2 % (ref 1.7–9.3)
NEUTROPHILS # BLD AUTO: 3.74 X10*3/UL (ref 1.4–6.5)
NEUTROPHILS NFR BLD AUTO: 64.16 % (ref 42.2–75.2)
PLATELET # BLD AUTO: 216.3 X10*3/UL (ref 150–450)
PMV BLD AUTO: 7.91 FL (ref 7.8–11)
POTASSIUM SERPL-SCNC: 5.2 MMOL/L (ref 3.5–5.1)
PROT SERPL-MCNC: 6.5 G/DL (ref 6.4–8.2)
RBC # BLD AUTO: 4.02 X10*6/UL (ref 3.9–5.3)
SODIUM SERPL-SCNC: 147 MMOL/L (ref 136–145)
TRIGL SERPL-MCNC: 98 MG/DL
VIT B12 SERPL-MCNC: 566 PG/ML (ref 193–986)
WBC # BLD AUTO: 5.83 X10*3/UL (ref 4.5–10.5)

## 2025-03-17 PROCEDURE — 1126F AMNT PAIN NOTED NONE PRSNT: CPT | Performed by: INTERNAL MEDICINE

## 2025-03-17 PROCEDURE — 82607 VITAMIN B-12: CPT | Performed by: INTERNAL MEDICINE

## 2025-03-17 PROCEDURE — 85025 COMPLETE CBC W/AUTO DIFF WBC: CPT | Performed by: INTERNAL MEDICINE

## 2025-03-17 PROCEDURE — 99213 OFFICE O/P EST LOW 20 MIN: CPT | Performed by: INTERNAL MEDICINE

## 2025-03-17 PROCEDURE — G2211 COMPLEX E/M VISIT ADD ON: HCPCS | Performed by: INTERNAL MEDICINE

## 2025-03-17 PROCEDURE — 83036 HEMOGLOBIN GLYCOSYLATED A1C: CPT | Performed by: INTERNAL MEDICINE

## 2025-03-17 PROCEDURE — 1157F ADVNC CARE PLAN IN RCRD: CPT | Performed by: INTERNAL MEDICINE

## 2025-03-17 PROCEDURE — 80061 LIPID PANEL: CPT | Performed by: INTERNAL MEDICINE

## 2025-03-17 PROCEDURE — 3074F SYST BP LT 130 MM HG: CPT | Performed by: INTERNAL MEDICINE

## 2025-03-17 PROCEDURE — 80053 COMPREHEN METABOLIC PANEL: CPT | Performed by: INTERNAL MEDICINE

## 2025-03-17 PROCEDURE — 3078F DIAST BP <80 MM HG: CPT | Performed by: INTERNAL MEDICINE

## 2025-03-17 PROCEDURE — 1036F TOBACCO NON-USER: CPT | Performed by: INTERNAL MEDICINE

## 2025-03-17 PROCEDURE — 82306 VITAMIN D 25 HYDROXY: CPT | Performed by: INTERNAL MEDICINE

## 2025-03-17 PROCEDURE — 1159F MED LIST DOCD IN RCRD: CPT | Performed by: INTERNAL MEDICINE

## 2025-03-17 ASSESSMENT — PAIN SCALES - GENERAL: PAINLEVEL_OUTOF10: 0-NO PAIN

## 2025-03-17 ASSESSMENT — ENCOUNTER SYMPTOMS
PALPITATIONS: 1
ARTHRALGIAS: 1
SLEEP DISTURBANCE: 1
SHORTNESS OF BREATH: 1
NERVOUS/ANXIOUS: 1
COUGH: 1
BACK PAIN: 1

## 2025-03-17 NOTE — PATIENT INSTRUCTIONS
Ways to Help Prevent Falls at Home    Quick Tips   ? Ask for help if you need it. Most people want to help!   ? Get up slowly after sitting or laying down   ? Wear a medical alert device or keep cell phone in your pocket   ? Use night lights, especially areas near a bathroom   ? Keep the items you use often within reach on a small stool or end table   ? Use an assistive device such as walker or cane, as directed by provider/physical therapy   ? Use a non-slip mat and grab bars in your bathroom. Look for home health sections for best options     Other Areas to Focus On   ? Exercise and nutrition: Regular exercise or taking a falls prevention class are great ways improve strength and balance. Don’t forget to stay hydrated and bring a snack!   ? Medicine side effects: Some medicines can make you sleepy or dizzy, which could cause a fall. Ask your healthcare provider about the side effects your medicines could cause. Be sure to let them know if you take any vitamins or supplements as well.   ? Tripping hazards: Remove items you could trip on, such as loose mats, rugs, cords, and clutter. Wear closed toe shoes with rubber soles.   ? Health and wellness: Get regular checkups with your healthcare provider, plus routine vision and hearing screenings. Talk with your healthcare provider about:   o Your medicines and the possible side effects - bring them in a bag if that is easier!   o Problems with balance or feeling dizzy   o Ways to promote bone health, such as Vitamin D and calcium supplements   o Questions or concerns about falling     *Ask your healthcare team if you have questions     [facility], 2022         Ways to Help Prevent Falls at Home    Quick Tips   ? Ask for help if you need it. Most people want to help!   ? Get up slowly after sitting or laying down   ? Wear a medical alert device or keep cell phone in your pocket   ? Use night lights, especially areas near a bathroom   ? Keep the items you  -- DO NOT REPLY / DO NOT REPLY ALL --  -- Message is from the [facility]--    General Patient Message      Reason for Call: Patient is calling today because she said she called over the weekend about a urination issue. She has been taking azo (medication) and drinking a lot of water and cranberry juice and her flow is better. It isn't hurting her anymore but she still wants the doctor to call her so she can get a better medication if necessary.    Caller Information       Type Contact Phone    09/27/2021 02:33 PM CDT Phone (Incoming) Nancy Hassan (Self) [phone] (M)          Alternative phone number: none    Turnaround time given to caller:   \"This message will be sent to [state Provider's name]. The clinical team will fulfill your request as soon as they review your message.\"     use often within reach on a small stool or end table   ? Use an assistive device such as walker or cane, as directed by provider/physical therapy   ? Use a non-slip mat and grab bars in your bathroom. Look for home health sections for best options     Other Areas to Focus On   ? Exercise and nutrition: Regular exercise or taking a falls prevention class are great ways improve strength and balance. Don’t forget to stay hydrated and bring a snack!   ? Medicine side effects: Some medicines can make you sleepy or dizzy, which could cause a fall. Ask your healthcare provider about the side effects your medicines could cause. Be sure to let them know if you take any vitamins or supplements as well.   ? Tripping hazards: Remove items you could trip on, such as loose mats, rugs, cords, and clutter. Wear closed toe shoes with rubber soles.   ? Health and wellness: Get regular checkups with your healthcare provider, plus routine vision and hearing screenings. Talk with your healthcare provider about:   o Your medicines and the possible side effects - bring them in a bag if that is easier!   o Problems with balance or feeling dizzy   o Ways to promote bone health, such as Vitamin D and calcium supplements   o Questions or concerns about falling     *Ask your healthcare team if you have questions     ©[facility], 2022

## 2025-03-17 NOTE — PROGRESS NOTES
Subjective   Patient ID: Estephania Fitzpatrick is a 84 y.o. female who presents with chief complaint of fatigue and malaise.  She is here for follow-up after hospitalization.  Bilateral leg edema.    HPI     This is an 84 years old [place] speaking lady with medical history significant for hypertension, asthma, COPD, hypothyroidism, diabetes diet controlled, obesity, arthralgia, degenerative joint disease, hyperlipidemia, anxiety, depression, urinary incontinence, s/p R carpal tunnel syndrome repair, s/p Hospitalization to [facility] 1/24/2019 to 11/25/2019 for CHF exacerbation, s/p ED visit 8/14/2022, s/p hospitalization for shortness of breath, asthma exacerbation, COVID, COPD exacerbation 11/25/2022 to 11/29/2022, s/p ED visit for Fall episode 6/20/2024, S/p Hospitalization to [facility] 3/4/2025 to 3/5/2025 for palpitations, CARMINA, Heart failure with preserved ejection Fraction.       Patient is  presented for evaluation and treatment of the above complaints.  Has been extremely weak.  Recovering after hospitalization.  Has bilateral leg edema.  Some of patient medications need to have correction.  She is here for blood work.  Has constipations.  Blood pressure is better controlled.     Stated, that has been having low back pain, difficulty to ambulate.     Has been having pain, taking Tylenol.     Stated, the blood glucose is stable, well controlled.  Has no issues with blood pressure control.  Has been having fatigue and malaise.  Depressed.  Has episodes of urinary incontinence.     Review of Systems   Respiratory:  Positive for cough and shortness of breath.    Cardiovascular:  Positive for palpitations and leg swelling.   Musculoskeletal:  Positive for arthralgias, back pain and gait problem.   Psychiatric/Behavioral:  Positive for sleep disturbance. The patient is nervous/anxious.        Objective   /62   Pulse 64   Temp 36.3 °C (97.3 °F) (Temporal)   Resp 16   Wt 112 kg (247 lb)   BMI  52.52 kg/m²     Physical Exam  Constitutional:       Appearance: She is obese.   HENT:      Head: Normocephalic and atraumatic.   Eyes:      Pupils: Pupils are equal, round, and reactive to light.   Cardiovascular:      Rate and Rhythm: Normal rate and regular rhythm.      Heart sounds: Normal heart sounds.   Pulmonary:      Breath sounds: Normal breath sounds.   Abdominal:      Palpations: Abdomen is soft.   Musculoskeletal:         General: Deformity present.      Right lower leg: Edema present.      Left lower leg: Edema present.   Skin:     General: Skin is warm.      Findings: Bruising and erythema present.   Neurological:      Mental Status: Mental status is at baseline.   Psychiatric:         Mood and Affect: Mood normal.         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 E11.51, I70.209    Relevant Orders    Hemoglobin A1C (Completed)    Hyperlipidemia, mixed E78.2    Relevant Orders    Comprehensive Metabolic Panel (Completed)    Lipid Panel (Completed)    Hypothyroidism E03.9    Mild vitamin D deficiency E55.9    Relevant Orders    Vitamin D 25-Hydroxy,Total (for eval of Vitamin D levels) (Completed)    Vitamin B 12 deficiency - Primary E53.8    Relevant Orders    Vitamin B12 (Completed)    Stage 3b chronic kidney disease (Multi) N18.32     Other Visit Diagnoses         Codes    Chronic obstructive pulmonary disease with (acute) exacerbation (Multi)     J44.1            S/p Hospitalization to [facility] 3/4/2025 to 3/5/2025 for palpitations, CARMINA, Heart failure with preserved ejection Fraction.  Slowly recovered, but still has bilateral leg edema, some shortness of breath.    S/p CARMINA.  Creat on admission was 2.56, GFR 18, discharge 1.51 GFR 34.    Asthma/ COPD exacerbation.  Augmentin, start on steroids.  Trial  of [facility] respiratory therapy.  Use inhalers.  Chest X ray.      R leg dermatitis.  Use moisturizing cream.     S/p ED visit 6/20/2024 for Fall episode.  Has had CT scan done neck, LS spine.  No evidence of Fx.  Gait training.  Tylenol as needed.     - Degenerative joint disease.  Low back pain.  Take Tylenol as needed.  Exercises much as you can.  Consider physical therapy.     Bilateral knee pain.  Due to R knee injection/ DR Kumar.     Gastroesophageal reflux disease.  Worse now.  Eat small portions.  Avoid Caffeine, spicy foods, chocolate, alcohol.  Do not wear tight clothes.  Keep last meal before bed time > 3 hours.  Keep head side of the bed elevated at all time.  Resume  Nexium.     -Gout.  Controlled now.  Continue to take allopurinol.  Keep a strict diet.  Avoid meat, legumes, alcohol.     - Diabetes type 2.  Well-controlled now.  Check your blood glucose daily.  Exercise, weight loss, diet.  Continue Januvia 50 mg daily, stop Metformin.  Microalbumin, Hb A1C.     - Hypothyroidism.  Continue with Synthroid 150 mcg 30 min before breakfast for now.    - HTN.   Stable now.   Avoid salt.     - H of  hospitalization [facility] for shortness of breath, asthma exacerbation, COVID, COPD exacerbation 11/25/2022 to 11/29/2022.   Avoid infection.  Advised to continue Current therapy.  Discussed with patient about medications administration.     - H of R thigh skin burn 2-3 degree.  Healed.     - Left leg varicosities.  Use compressive stockings daily.     - Spinal stenosis.  Low back pain.  PT, exercise.  Take Tylenol as needed.      -Obstructive sleep apnea.  Has mask at home, but unable to use it.     - H of right carpal tunnel syndrome repair.  Healed, improved.  Follow-up with orthopedic surgery as needed.     -Depression, anxiety.  Follow-up with psychiatry.  Not taking any medications now.       - Urinary incontinence.  Protective wear.     -Health maintenance.  Medicare Wellness Annual exam is up to  date.  Declined vaccinations.  GYN exam, mammography declined.     - H of  Fall episode, s/p ED visit 8/14/2022.   Stable, improved.     Unsteady gait.  Use walker with wheels.     CKD .  Last creatinine 2.2.  Avoid nephrotoxic agents.  Fluids.  Repeat BW today.     Hyperlipidemia.   Keep Mediterranean diet.  Exercise,diet.  Repeat today.     -Morbid Obesity with BMI is 52.52.  Patient is keeping diet, cannot exercise, due to bilateral knee pain.  Encouraged ambulation.  Exercise, keep your portions small.              Patient was identified as a fall risk. Risk prevention instructions provided.Patient was identified as a fall risk. Risk prevention instructions provided.

## 2025-03-18 DIAGNOSIS — E55.9 MILD VITAMIN D DEFICIENCY: Primary | ICD-10-CM

## 2025-03-18 RX ORDER — ACETAMINOPHEN 500 MG
5000 TABLET ORAL DAILY
Qty: 90 TABLET | Refills: 2 | Status: SHIPPED | OUTPATIENT
Start: 2025-03-18 | End: 2025-06-16

## 2025-04-09 DIAGNOSIS — E66.9 TYPE 2 DIABETES MELLITUS WITH OBESITY (MULTI): ICD-10-CM

## 2025-04-09 DIAGNOSIS — M10.9 GOUT, UNSPECIFIED: ICD-10-CM

## 2025-04-09 DIAGNOSIS — M19.90 UNSPECIFIED OSTEOARTHRITIS, UNSPECIFIED SITE: ICD-10-CM

## 2025-04-09 DIAGNOSIS — E11.69 TYPE 2 DIABETES MELLITUS WITH OBESITY (MULTI): ICD-10-CM

## 2025-04-09 DIAGNOSIS — I10 PRIMARY HYPERTENSION: ICD-10-CM

## 2025-04-09 RX ORDER — ALLOPURINOL 300 MG/1
300 TABLET ORAL DAILY
Qty: 90 TABLET | Refills: 2 | Status: SHIPPED | OUTPATIENT
Start: 2025-04-09

## 2025-04-09 RX ORDER — ACETAMINOPHEN 500 MG
TABLET ORAL
Qty: 100 TABLET | Refills: 2 | Status: SHIPPED | OUTPATIENT
Start: 2025-04-09

## 2025-04-09 RX ORDER — METFORMIN HYDROCHLORIDE 500 MG/1
TABLET ORAL
Qty: 180 TABLET | Refills: 2 | Status: SHIPPED | OUTPATIENT
Start: 2025-04-09

## 2025-04-09 RX ORDER — OLMESARTAN MEDOXOMIL 40 MG/1
40 TABLET ORAL DAILY
Qty: 90 TABLET | Refills: 2 | Status: SHIPPED | OUTPATIENT
Start: 2025-04-09

## 2025-05-02 ENCOUNTER — APPOINTMENT (OUTPATIENT)
Dept: PRIMARY CARE | Facility: CLINIC | Age: 85
End: 2025-05-02
Payer: MEDICARE

## 2025-05-02 DIAGNOSIS — R60.0 EDEMA OF BOTH LEGS: Primary | ICD-10-CM

## 2025-05-02 DIAGNOSIS — Z00.00 ROUTINE GENERAL MEDICAL EXAMINATION AT HEALTH CARE FACILITY: ICD-10-CM

## 2025-05-02 PROCEDURE — 1160F RVW MEDS BY RX/DR IN RCRD: CPT | Performed by: INTERNAL MEDICINE

## 2025-05-02 PROCEDURE — 1159F MED LIST DOCD IN RCRD: CPT | Performed by: INTERNAL MEDICINE

## 2025-05-02 PROCEDURE — 3075F SYST BP GE 130 - 139MM HG: CPT | Performed by: INTERNAL MEDICINE

## 2025-05-02 PROCEDURE — 3078F DIAST BP <80 MM HG: CPT | Performed by: INTERNAL MEDICINE

## 2025-05-02 PROCEDURE — 1036F TOBACCO NON-USER: CPT | Performed by: INTERNAL MEDICINE

## 2025-05-02 PROCEDURE — 1157F ADVNC CARE PLAN IN RCRD: CPT | Performed by: INTERNAL MEDICINE

## 2025-05-02 PROCEDURE — G0439 PPPS, SUBSEQ VISIT: HCPCS | Performed by: INTERNAL MEDICINE

## 2025-05-02 PROCEDURE — 99213 OFFICE O/P EST LOW 20 MIN: CPT | Performed by: INTERNAL MEDICINE

## 2025-05-02 PROCEDURE — 1170F FXNL STATUS ASSESSED: CPT | Performed by: INTERNAL MEDICINE

## 2025-05-02 RX ORDER — FUROSEMIDE 40 MG/1
40 TABLET ORAL DAILY
Qty: 30 TABLET | Refills: 6 | Status: SHIPPED | OUTPATIENT
Start: 2025-05-02

## 2025-05-02 ASSESSMENT — PATIENT HEALTH QUESTIONNAIRE - PHQ9
SUM OF ALL RESPONSES TO PHQ9 QUESTIONS 1 AND 2: 2
2. FEELING DOWN, DEPRESSED OR HOPELESS: SEVERAL DAYS
1. LITTLE INTEREST OR PLEASURE IN DOING THINGS: SEVERAL DAYS
10. IF YOU CHECKED OFF ANY PROBLEMS, HOW DIFFICULT HAVE THESE PROBLEMS MADE IT FOR YOU TO DO YOUR WORK, TAKE CARE OF THINGS AT HOME, OR GET ALONG WITH OTHER PEOPLE: SOMEWHAT DIFFICULT

## 2025-05-02 ASSESSMENT — ACTIVITIES OF DAILY LIVING (ADL)
JUDGMENT_ADEQUATE_SAFELY_COMPLETE_DAILY_ACTIVITIES: YES
GROOMING: INDEPENDENT
ASSISTIVE_DEVICE: CANE;WALKER;SHOWER CHAIR
BATHING: NEEDS ASSISTANCE
HEARING - LEFT EAR: FUNCTIONAL
HEARING - RIGHT EAR: FUNCTIONAL
PATIENT'S MEMORY ADEQUATE TO SAFELY COMPLETE DAILY ACTIVITIES?: YES
DRESSING YOURSELF: INDEPENDENT
ADEQUATE_TO_COMPLETE_ADL: YES
WALKS IN HOME: INDEPENDENT
GROCERY SHOPPING: NEEDS ASSISTANCE
EATING: INDEPENDENT
USING TRANSPORTATION: TOTAL CARE
DOING HOUSEWORK: NEEDS ASSISTANCE
USING TELEPHONE: NEEDS ASSISTANCE
PILL BOX USED: YES
STIL DRIVING: NO
PREPARING MEALS: NEEDS ASSISTANCE
MANAGING FINANCES: NEEDS ASSISTANCE
TOILETING: INDEPENDENT
TAKING MEDICATION: NEEDS ASSISTANCE
NEEDS ASSISTANCE WITH FOOD: INDEPENDENT
FEEDING YOURSELF: INDEPENDENT

## 2025-05-02 ASSESSMENT — ANXIETY QUESTIONNAIRES
3. WORRYING TOO MUCH ABOUT DIFFERENT THINGS: SEVERAL DAYS
6. BECOMING EASILY ANNOYED OR IRRITABLE: SEVERAL DAYS
7. FEELING AFRAID AS IF SOMETHING AWFUL MIGHT HAPPEN: SEVERAL DAYS
5. BEING SO RESTLESS THAT IT IS HARD TO SIT STILL: SEVERAL DAYS
GAD7 TOTAL SCORE: 7
1. FEELING NERVOUS, ANXIOUS, OR ON EDGE: SEVERAL DAYS
IF YOU CHECKED OFF ANY PROBLEMS ON THIS QUESTIONNAIRE, HOW DIFFICULT HAVE THESE PROBLEMS MADE IT FOR YOU TO DO YOUR WORK, TAKE CARE OF THINGS AT HOME, OR GET ALONG WITH OTHER PEOPLE: SOMEWHAT DIFFICULT
2. NOT BEING ABLE TO STOP OR CONTROL WORRYING: SEVERAL DAYS
4. TROUBLE RELAXING: SEVERAL DAYS

## 2025-05-02 ASSESSMENT — ENCOUNTER SYMPTOMS
DEPRESSION: 1
OCCASIONAL FEELINGS OF UNSTEADINESS: 1
LOSS OF SENSATION IN FEET: 1

## 2025-05-02 ASSESSMENT — GERIATRIC MINI NUTRITIONAL ASSESSMENT (MNA)
B WEIGHT LOSS DURING THE LAST 3 MONTHS: NO WEIGHT LOSS
A HAS FOOD INTAKE DECLINED OVER THE PAST 3 MONTHS DUE TO LOSS OF APPETITE, DIGESTIVE PROBLEMS, CHEWING OR SWALLOWING DIFFICULTIES?: NO DECREASE IN FOOD INTAKE
C GENERAL MOBILITY: GOES OUT

## 2025-05-02 ASSESSMENT — COLUMBIA-SUICIDE SEVERITY RATING SCALE - C-SSRS
2. HAVE YOU ACTUALLY HAD ANY THOUGHTS OF KILLING YOURSELF?: NO
1. IN THE PAST MONTH, HAVE YOU WISHED YOU WERE DEAD OR WISHED YOU COULD GO TO SLEEP AND NOT WAKE UP?: NO
6. HAVE YOU EVER DONE ANYTHING, STARTED TO DO ANYTHING, OR PREPARED TO DO ANYTHING TO END YOUR LIFE?: NO

## 2025-05-02 NOTE — PROGRESS NOTES
"Subjective   Patient ID: Estephania Fitzpatrick is a 85 y.o. female who presents for Medicare wellness annual exam.  Has arthralgia, low back pain.  Having bilateral knee pain.    HPI     This is an 85 years old Japanese speaking lady with medical history significant for hypertension, asthma, COPD, hypothyroidism, diabetes diet controlled, obesity, arthralgia, degenerative joint disease, hyperlipidemia, anxiety, depression, urinary incontinence, s/p R carpal tunnel syndrome repair, s/p Hospitalization to [facility] 1/24/2019 to 11/25/2019 for CHF exacerbation, s/p ED visit 8/14/2022, s/p hospitalization for shortness of breath, asthma exacerbation, COVID, COPD exacerbation 11/25/2022 to 11/29/2022, s/p ED visit for Fall episode 6/20/2024, S/p Hospitalization to [facility] 3/4/2025 to 3/5/2025 for palpitations, CARMINA, Heart failure with preserved ejection Fraction.        Patient is  presented for evaluation and treatment of the above complaints.  She is here for Medicare Wellness Annual exam.     Has bilateral leg edema.  Some of patient medications need to have correction.  She is here for blood work.  Has constipations.  Blood pressure is better controlled.     Stated, that has been having low back pain, difficulty to ambulate.     Has been having pain, taking Tylenol.     Stated, the blood glucose is stable, well controlled.  Has no issues with blood pressure control.  Has been having fatigue and malaise.  Depressed.  Has episodes of urinary incontinence.     Review of Systems   Constitutional:  Positive for activity change and fatigue.   Cardiovascular:  Positive for leg swelling.   Musculoskeletal:  Positive for arthralgias, back pain and gait problem.   Psychiatric/Behavioral:  The patient is nervous/anxious.        Objective   /78   Pulse 68   Temp 36.3 °C (97.4 °F) (Temporal)   Resp 16   Ht 1.473 m (4' 10\")   Wt 111 kg (244 lb)   BMI 51.00 kg/m²     Physical Exam  HENT:      Head: " Normocephalic.      Mouth/Throat:      Mouth: Mucous membranes are moist.   Cardiovascular:      Rate and Rhythm: Normal rate and regular rhythm.   Pulmonary:      Effort: Pulmonary effort is normal.      Breath sounds: Normal breath sounds.   Abdominal:      Palpations: Abdomen is soft.   Musculoskeletal:         General: Tenderness present.      Cervical back: Normal range of motion.      Right lower leg: Edema present.      Left lower leg: Edema present.   Skin:     Findings: Erythema and rash present.   Neurological:      Mental Status: She is alert. Mental status is at baseline.   Psychiatric:         Mood and Affect: Mood normal.         Assessment/Plan   Problem List Items Addressed This Visit           ICD-10-CM    Edema of both legs - Primary R60.0    Relevant Medications    furosemide (Lasix) 40 mg tablet     Other Visit Diagnoses         Codes      Routine general medical examination at health care facility     Z00.00    Relevant Orders    1 Year Follow Up In Primary Care - Wellness Exam            Bilateral  knee pain.  Take Tylenol as needed for pain.  Not a candidate for surgery.  Weight loss.    S/p Hospitalization to [facility] 3/4/2025 to 3/5/2025 for palpitations, CARMINA, Heart failure with preserved ejection Fraction.  Slowly recovered, but still has bilateral leg edema, some shortness of breath.     S/p CARMINA.  Creat on admission was 2.56, GFR 18, discharge 1.51 GFR 34.  Repeat now.     Asthma/ COPD exacerbation.  Much improved.  Trial of Trelegy respiratory therapy.  Use inhalers.  Chest X ray.      R leg dermatitis.  Use moisturizing cream.     S/p ED visit 6/20/2024 for Fall episode.  Has had CT scan done neck, LS spine.  No evidence of Fx.  Gait training.  Tylenol as needed.     - Degenerative joint disease.  Low back pain.  Take Tylenol as needed.  Exercises much as you can.  Consider physical therapy.     Bilateral knee pain.  Due to R knee injection/ DR Kumar.     Gastroesophageal reflux  disease.  Worse now.  Eat small portions.  Avoid Caffeine, spicy foods, chocolate, alcohol.  Do not wear tight clothes.  Keep last meal before bed time > 3 hours.  Keep head side of the bed elevated at all time.  Resume  Nexium.     -Gout.  Controlled now.  Continue to take allopurinol.  Keep a strict diet.  Avoid meat, legumes, alcohol.     - Diabetes type 2.  Well-controlled now.  Check your blood glucose daily.  Exercise, weight loss, diet.  Continue Januvia 50 mg daily, stop Metformin.  Microalbumin, Hb A1C.     - Hypothyroidism.  Continue with Synthroid 150 mcg 30 min before breakfast for now.    - HTN.   Stable now.   Avoid salt.     - H of  hospitalization [facility] for shortness of breath, asthma exacerbation, COVID, COPD exacerbation 11/25/2022 to 11/29/2022.   Avoid infection.  Advised to continue Current therapy.  Discussed with patient about medications administration.     - H of R thigh skin burn 2-3 degree.  Healed.     - Left leg varicosities.  Use compressive stockings daily.     - Spinal stenosis.  Low back pain.  PT, exercise.  Take Tylenol as needed.      -Obstructive sleep apnea.  Has mask at home, but unable to use it.     - H of right carpal tunnel syndrome repair.  Healed, improved.  Follow-up with orthopedic surgery as needed.     -Depression, anxiety.  Follow-up with psychiatry.  Not taking any medications now.       - Urinary incontinence.  Protective wear.     -Health maintenance.  Medicare Wellness Annual exam is  today.  Declined vaccinations.  GYN exam, mammography declined.     - H of  Fall episode, s/p ED visit 8/14/2022.   Stable, improved.     Unsteady gait.  Use walker with wheels.     CKD .  Last creatinine 2.2.  Avoid nephrotoxic agents.  Fluids.  Repeat BW today.     Hyperlipidemia.   Keep Mediterranean diet.  Exercise,diet.  Repeat today.     -Morbid Obesity with BMI is  51.00  Patient is keeping diet, cannot exercise, due to bilateral knee pain.  Encouraged  ambulation.  Exercise, keep your portions small.

## 2025-05-04 VITALS
HEART RATE: 68 BPM | SYSTOLIC BLOOD PRESSURE: 138 MMHG | HEIGHT: 58 IN | DIASTOLIC BLOOD PRESSURE: 78 MMHG | WEIGHT: 244 LBS | BODY MASS INDEX: 51.22 KG/M2 | RESPIRATION RATE: 16 BRPM | TEMPERATURE: 97.4 F

## 2025-05-04 ASSESSMENT — ENCOUNTER SYMPTOMS
FATIGUE: 1
BACK PAIN: 1
ARTHRALGIAS: 1
ACTIVITY CHANGE: 1
NERVOUS/ANXIOUS: 1

## 2025-05-20 ENCOUNTER — OFFICE VISIT (OUTPATIENT)
Dept: PRIMARY CARE | Facility: CLINIC | Age: 85
End: 2025-05-20
Payer: MEDICARE

## 2025-05-20 VITALS
BODY MASS INDEX: 51.83 KG/M2 | HEART RATE: 64 BPM | SYSTOLIC BLOOD PRESSURE: 112 MMHG | TEMPERATURE: 97.9 F | DIASTOLIC BLOOD PRESSURE: 68 MMHG | RESPIRATION RATE: 16 BRPM | WEIGHT: 248 LBS

## 2025-05-20 DIAGNOSIS — R53.83 MALAISE AND FATIGUE: ICD-10-CM

## 2025-05-20 DIAGNOSIS — I27.20 PULMONARY HYPERTENSION (MULTI): Primary | ICD-10-CM

## 2025-05-20 DIAGNOSIS — J68.3 REACTIVE AIRWAYS DYSFUNCTION SYNDROME (MULTI): ICD-10-CM

## 2025-05-20 DIAGNOSIS — D50.9 IRON DEFICIENCY ANEMIA, UNSPECIFIED IRON DEFICIENCY ANEMIA TYPE: ICD-10-CM

## 2025-05-20 DIAGNOSIS — I87.2 STASIS DERMATITIS OF BOTH LEGS: ICD-10-CM

## 2025-05-20 DIAGNOSIS — R53.81 MALAISE AND FATIGUE: ICD-10-CM

## 2025-05-20 DIAGNOSIS — K21.9 GASTROESOPHAGEAL REFLUX DISEASE, UNSPECIFIED WHETHER ESOPHAGITIS PRESENT: ICD-10-CM

## 2025-05-20 DIAGNOSIS — E66.01 MORBID (SEVERE) OBESITY DUE TO EXCESS CALORIES (MULTI): ICD-10-CM

## 2025-05-20 DIAGNOSIS — S80.822S: ICD-10-CM

## 2025-05-20 DIAGNOSIS — J44.0 CHRONIC OBSTRUCTIVE PULMONARY DISEASE WITH (ACUTE) LOWER RESPIRATORY INFECTION: ICD-10-CM

## 2025-05-20 DIAGNOSIS — N18.32 STAGE 3B CHRONIC KIDNEY DISEASE (MULTI): ICD-10-CM

## 2025-05-20 DIAGNOSIS — R60.0 EDEMA OF BOTH LEGS: ICD-10-CM

## 2025-05-20 DIAGNOSIS — E11.9 DIABETES MELLITUS TYPE 2 WITHOUT RETINOPATHY (MULTI): ICD-10-CM

## 2025-05-20 PROBLEM — E11.51 DIABETES TYPE 2 WITH ATHEROSCLEROSIS OF ARTERIES OF EXTREMITIES: Status: RESOLVED | Noted: 2023-04-04 | Resolved: 2025-05-20

## 2025-05-20 PROBLEM — E66.813 CLASS 3 SEVERE OBESITY DUE TO EXCESS CALORIES WITH BODY MASS INDEX (BMI) OF 40.0 TO 44.9 IN ADULT: Status: RESOLVED | Noted: 2023-04-04 | Resolved: 2025-05-20

## 2025-05-20 PROBLEM — J44.9 COPD (CHRONIC OBSTRUCTIVE PULMONARY DISEASE) (MULTI): Status: RESOLVED | Noted: 2023-04-04 | Resolved: 2025-05-20

## 2025-05-20 PROBLEM — I70.209 DIABETES TYPE 2 WITH ATHEROSCLEROSIS OF ARTERIES OF EXTREMITIES: Status: RESOLVED | Noted: 2023-04-04 | Resolved: 2025-05-20

## 2025-05-20 PROCEDURE — 3074F SYST BP LT 130 MM HG: CPT | Performed by: INTERNAL MEDICINE

## 2025-05-20 PROCEDURE — 3078F DIAST BP <80 MM HG: CPT | Performed by: INTERNAL MEDICINE

## 2025-05-20 PROCEDURE — 1158F ADVNC CARE PLAN TLK DOCD: CPT | Performed by: INTERNAL MEDICINE

## 2025-05-20 PROCEDURE — 84443 ASSAY THYROID STIM HORMONE: CPT | Performed by: INTERNAL MEDICINE

## 2025-05-20 PROCEDURE — 1125F AMNT PAIN NOTED PAIN PRSNT: CPT | Performed by: INTERNAL MEDICINE

## 2025-05-20 PROCEDURE — 1160F RVW MEDS BY RX/DR IN RCRD: CPT | Performed by: INTERNAL MEDICINE

## 2025-05-20 PROCEDURE — 1123F ACP DISCUSS/DSCN MKR DOCD: CPT | Performed by: INTERNAL MEDICINE

## 2025-05-20 PROCEDURE — 99214 OFFICE O/P EST MOD 30 MIN: CPT | Performed by: INTERNAL MEDICINE

## 2025-05-20 PROCEDURE — 85025 COMPLETE CBC W/AUTO DIFF WBC: CPT | Performed by: INTERNAL MEDICINE

## 2025-05-20 PROCEDURE — 15852 DRESSING CHANGE NOT FOR BURN: CPT | Performed by: INTERNAL MEDICINE

## 2025-05-20 PROCEDURE — 1036F TOBACCO NON-USER: CPT | Performed by: INTERNAL MEDICINE

## 2025-05-20 PROCEDURE — 80053 COMPREHEN METABOLIC PANEL: CPT | Performed by: INTERNAL MEDICINE

## 2025-05-20 PROCEDURE — 1159F MED LIST DOCD IN RCRD: CPT | Performed by: INTERNAL MEDICINE

## 2025-05-20 RX ORDER — ERGOCALCIFEROL 1.25 MG/1
1.25 CAPSULE ORAL
COMMUNITY

## 2025-05-20 RX ORDER — FUROSEMIDE 40 MG/1
40 TABLET ORAL DAILY
Qty: 30 TABLET | Refills: 6 | Status: SHIPPED | OUTPATIENT
Start: 2025-05-20

## 2025-05-20 RX ORDER — AMMONIUM LACTATE 12 G/100G
CREAM TOPICAL
COMMUNITY
Start: 2025-04-28

## 2025-05-20 RX ORDER — ESOMEPRAZOLE MAGNESIUM 40 MG/1
40 CAPSULE, DELAYED RELEASE ORAL
Qty: 30 CAPSULE | Refills: 6 | Status: SHIPPED | OUTPATIENT
Start: 2025-05-20 | End: 2025-06-19

## 2025-05-20 ASSESSMENT — ENCOUNTER SYMPTOMS
BACK PAIN: 1
ACTIVITY CHANGE: 1
COLOR CHANGE: 1
NERVOUS/ANXIOUS: 1
FATIGUE: 1
ARTHRALGIAS: 1

## 2025-05-20 ASSESSMENT — PAIN SCALES - GENERAL: PAINLEVEL_OUTOF10: 7

## 2025-05-20 NOTE — PROGRESS NOTES
Subjective   Patient ID: Estephania Fitzpatrick is a 85 y.o. female who presents  with chief complaint of bilateral leg edema, blisters, skin discoloration, bilateral leg pain.  Noticed fluid seepage from her left leg.    HPI     This is an 85 years old Omani speaking lady with medical history significant for hypertension, asthma, COPD, hypothyroidism, diabetes diet controlled, obesity, arthralgia, degenerative joint disease, hyperlipidemia, anxiety, depression, urinary incontinence, s/p R carpal tunnel syndrome repair, s/p Hospitalization to [facility] 1/24/2019 to 11/25/2019 for CHF exacerbation, s/p ED visit 8/14/2022, s/p hospitalization for shortness of breath, asthma exacerbation, COVID, COPD exacerbation 11/25/2022 to 11/29/2022, s/p ED visit for Fall episode 6/20/2024, S/p Hospitalization to [facility] 3/4/2025 to 3/5/2025 for palpitations, CARMINA, Heart failure with preserved ejection Fraction.      Patient is  presented for evaluation and treatment of the above complaints.  Has chronic bilateral leg edema.  Patient developed right leg discoloration, chronic skin changes.  Noticed blisters on left leg, see picture of the fluid, discoloration.  Was not taking diuretics.  Has constipations.  Blood pressure is better controlled.     Stated, that has been having low back pain, difficulty to ambulate.     Has been having pain, taking Tylenol.     Stated, the blood glucose is stable, well controlled.  Has no issues with blood pressure control.  Has been having fatigue and malaise.  Depressed.  Has episodes of urinary incontinence.     Review of Systems   Constitutional:  Positive for activity change and fatigue.   Musculoskeletal:  Positive for arthralgias and back pain.   Skin:  Positive for color change and rash.   Psychiatric/Behavioral:  The patient is nervous/anxious.        Objective   /68   Pulse 64   Temp 36.6 °C (97.9 °F) (Temporal)   Resp 16   Wt 112 kg (248 lb)   BMI 51.83 kg/m²      Physical Exam  Constitutional:       Appearance: She is obese.   HENT:      Head: Normocephalic and atraumatic.   Cardiovascular:      Rate and Rhythm: Normal rate and regular rhythm.      Heart sounds: Normal heart sounds.   Pulmonary:      Breath sounds: Normal breath sounds.   Abdominal:      Palpations: Abdomen is soft.   Musculoskeletal:         General: Tenderness present.      Right lower leg: Edema present.      Left lower leg: Edema present.   Skin:     General: Skin is dry.      Findings: Erythema, lesion and rash present.   Neurological:      Mental Status: Mental status is at baseline.   Psychiatric:         Mood and Affect: Mood normal.         Assessment/Plan   Problem List Items Addressed This Visit           ICD-10-CM    Anemia D64.9    Relevant Orders    CBC w/5 Part Differential, [facility]    Edema of both legs R60.0    Relevant Medications    furosemide (Lasix) 40 mg tablet    Esophageal reflux K21.9    Relevant Medications    esomeprazole (NexIUM) 40 mg DR capsule    Stage 3b chronic kidney disease (Multi) N18.32    Relevant Orders    Comprehensive Metabolic Panel    Pulmonary hypertension (Multi) - Primary I27.20    Diabetes mellitus type 2 without retinopathy (Multi) E11.9     Other Visit Diagnoses         Codes      Reactive airways dysfunction syndrome (Multi)     J68.3      Chronic obstructive pulmonary disease with (acute) lower respiratory infection     J44.0      Morbid (severe) obesity due to excess calories (Multi)     E66.01      Malaise and fatigue     R53.81, R53.83    Relevant Orders    Thyroid Stimulating Hormone      Blister of left lower extremity, sequela [S80.822S]     S80.822S      Stasis dermatitis of both legs [I87.2]     I87.2          Bilateral leg edema.  L leg blisters.  Right leg chronic skin changes.  Patient bilateral legs were cleaned, right leg dead skin has been removed, dressing changes with Xeroform has been applied bilaterally.  Keep your legs  elevated.  Take furosemide 40 mg daily.  Hold on Benicar, when taking furosemide.    S/p Hospitalization to [facility] 3/4/2025 to 3/5/2025 for palpitations, CARMINA, Heart failure with preserved ejection Fraction.  Slowly recovered, but still has bilateral leg edema, some shortness of breath.     S/p CARMINA.  Creat on admission was 2.56, GFR 18, discharge 1.51 GFR 34.  Repeat now.     Asthma/ COPD exacerbation.  Much improved.  Trial of Trelegy respiratory therapy.  Use inhalers.  Chest X ray.      R leg dermatitis.  Use moisturizing cream.     S/p ED visit 6/20/2024 for Fall episode.  Has had CT scan done neck, LS spine.  No evidence of Fx.  Gait training.  Tylenol as needed.     - Degenerative joint disease.  Low back pain.  Take Tylenol as needed.  Exercises much as you can.  Consider physical therapy.     Bilateral knee pain.  Due to R knee injection/ DR Kumar.     Gastroesophageal reflux disease.  Worse now.  Eat small portions.  Avoid Caffeine, spicy foods, chocolate, alcohol.  Do not wear tight clothes.  Keep last meal before bed time > 3 hours.  Keep head side of the bed elevated at all time.  Resume  Nexium.     -Gout.  Controlled now.  Continue to take allopurinol.  Keep a strict diet.  Avoid meat, legumes, alcohol.     - Diabetes type 2.  Well-controlled now.  Check your blood glucose daily.  Exercise, weight loss, diet.  Continue Januvia 50 mg daily, stop Metformin.  Microalbumin, Hb A1C.     - Hypothyroidism.  Continue with Synthroid 150 mcg 30 min before breakfast for now.    - HTN.   Stable now.   Avoid salt.     - H of  hospitalization [facility] for shortness of breath, asthma exacerbation, COVID, COPD exacerbation 11/25/2022 to 11/29/2022.   Avoid infection.  Advised to continue Current therapy.  Discussed with patient about medications administration.     - H of R thigh skin burn 2-3 degree.  Healed.     - Left leg varicosities.  Use compressive stockings daily.     - Spinal stenosis.  Low  back pain.  PT, exercise.  Take Tylenol as needed.      -Obstructive sleep apnea.  Has mask at home, but unable to use it.     - H of right carpal tunnel syndrome repair.  Healed, improved.  Follow-up with orthopedic surgery as needed.     -Depression, anxiety.  Follow-up with psychiatry.  Not taking any medications now.       - Urinary incontinence.  Protective wear.     -Health maintenance.  Medicare Wellness Annual exam is  today.  Declined vaccinations.  GYN exam, mammography declined.     - H of  Fall episode, s/p ED visit 8/14/2022.   Stable, improved.     Unsteady gait.  Use walker with wheels.     CKD .  Last creatinine 2.2.  Avoid nephrotoxic agents.  Fluids.  Repeat BW today.     Hyperlipidemia.   Keep Mediterranean diet.  Exercise,diet.  Repeat today.     -Morbid Obesity with BMI is  51.00  Patient is keeping diet, cannot exercise, due to bilateral knee pain.  Encouraged ambulation.  Exercise, keep your portions small.        Patient has blisters of her L leg, has severe edema, chronic skin changes of right leg.  Will greatly benefit to have SN  at home to help with dressing changes, disease teaching, control of his symptoms.  Patient will need help with medications administration.

## 2025-05-21 LAB
ALBUMIN SERPL BCP-MCNC: 3.5 G/DL (ref 3.4–5)
ALP SERPL-CCNC: 122 U/L (ref 45–117)
ALT SERPL W P-5'-P-CCNC: 20 U/L (ref 16–63)
ANION GAP SERPL CALC-SCNC: 8 MMOL/L (ref 10–20)
AST SERPL W P-5'-P-CCNC: 18 U/L (ref 15–37)
BASOPHILS # BLD AUTO: 0.02 X10*3/UL (ref 0.1–1.6)
BASOPHILS NFR BLD AUTO: 0.29 % (ref 0–0.3)
BILIRUB SERPL-MCNC: 0.5 MG/DL (ref 0.2–1)
BUN SERPL-MCNC: 28 MG/DL (ref 7–18)
CALCIUM SERPL-MCNC: 8.9 MG/DL (ref 8.5–10.1)
CHLORIDE SERPL-SCNC: 103 MMOL/L (ref 98–107)
CO2 SERPL-SCNC: 32 MMOL/L (ref 21–32)
CREAT SERPL-MCNC: 1.32 MG/DL (ref 0.6–1.1)
EGFRCR SERPLBLD CKD-EPI 2021: 40 ML/MIN/1.73M*2
EOSINOPHIL # BLD AUTO: 0.23 X10*3/UL (ref 0.04–0.5)
EOSINOPHIL NFR BLD AUTO: 3.74 % (ref 0.7–7)
ERYTHROCYTE [DISTWIDTH] IN BLOOD BY AUTOMATED COUNT: 14.6 % (ref 11.5–14.5)
GLUCOSE SERPL-MCNC: 101 MG/DL (ref 74–100)
HCT VFR BLD AUTO: 37.7 % (ref 36.6–46.6)
HGB BLD-MCNC: 12.88 G/DL (ref 12–15.4)
LYMPHOCYTES # BLD AUTO: 1.25 X10*3/UL (ref 0–6)
LYMPHOCYTES NFR BLD AUTO: 20.41 % (ref 20.5–51.1)
MCH RBC QN AUTO: 32 PG (ref 26–32)
MCHC RBC AUTO-ENTMCNC: 34.2 G/DL (ref 31–38)
MCV RBC AUTO: 93.9 FL (ref 80–96)
MONOCYTES # BLD AUTO: 0.52 X10*3/UL (ref 1.6–24.9)
MONOCYTES NFR BLD AUTO: 8.55 % (ref 1.7–9.3)
NEUTROPHILS # BLD AUTO: 4.09 X10*3/UL (ref 1.4–6.5)
NEUTROPHILS NFR BLD AUTO: 67.01 % (ref 42.2–75.2)
PLATELET # BLD AUTO: 253.7 X10*3/UL (ref 150–450)
PMV BLD AUTO: 9.01 FL (ref 7.8–11)
POTASSIUM SERPL-SCNC: 4.9 MMOL/L (ref 3.5–5.1)
PROT SERPL-MCNC: 6.6 G/DL (ref 6.4–8.2)
RBC # BLD AUTO: 4.02 X10*6/UL (ref 3.9–5.3)
SODIUM SERPL-SCNC: 138 MMOL/L (ref 136–145)
TSH SERPL-ACNC: 7.46 MIU/L (ref 0.44–3.98)
WBC # BLD AUTO: 6.1 X10*3/UL (ref 4.5–10.5)

## 2025-05-27 ENCOUNTER — APPOINTMENT (OUTPATIENT)
Dept: PRIMARY CARE | Facility: CLINIC | Age: 85
End: 2025-05-27
Payer: MEDICARE

## 2025-06-03 ENCOUNTER — APPOINTMENT (OUTPATIENT)
Dept: PRIMARY CARE | Facility: CLINIC | Age: 85
End: 2025-06-03
Payer: MEDICARE

## 2025-06-03 VITALS
HEART RATE: 64 BPM | WEIGHT: 248 LBS | SYSTOLIC BLOOD PRESSURE: 138 MMHG | RESPIRATION RATE: 16 BRPM | DIASTOLIC BLOOD PRESSURE: 78 MMHG | TEMPERATURE: 97.8 F | BODY MASS INDEX: 51.83 KG/M2

## 2025-06-03 DIAGNOSIS — N18.32 STAGE 3B CHRONIC KIDNEY DISEASE (MULTI): ICD-10-CM

## 2025-06-03 DIAGNOSIS — S81.802S OPEN WOUND OF BOTH LOWER EXTREMITIES WITH COMPLICATION, SEQUELA: Primary | ICD-10-CM

## 2025-06-03 DIAGNOSIS — S81.801S OPEN WOUND OF BOTH LOWER EXTREMITIES WITH COMPLICATION, SEQUELA: Primary | ICD-10-CM

## 2025-06-03 RX ORDER — METFORMIN HYDROCHLORIDE 500 MG/1
TABLET ORAL
COMMUNITY
Start: 2025-05-30

## 2025-06-03 RX ORDER — LANSOPRAZOLE 30 MG/1
CAPSULE, DELAYED RELEASE ORAL
COMMUNITY
Start: 2025-05-20

## 2025-06-03 RX ORDER — DOXYCYCLINE 100 MG/1
100 CAPSULE ORAL 2 TIMES DAILY
Qty: 20 CAPSULE | Refills: 0 | Status: SHIPPED | OUTPATIENT
Start: 2025-06-03 | End: 2025-06-13

## 2025-06-03 ASSESSMENT — ENCOUNTER SYMPTOMS
BACK PAIN: 1
ARTHRALGIAS: 1
WOUND: 1
COLOR CHANGE: 1
LIGHT-HEADEDNESS: 1

## 2025-06-03 ASSESSMENT — PAIN SCALES - GENERAL: PAINLEVEL_OUTOF10: 7

## 2025-06-03 NOTE — PROGRESS NOTES
Subjective   Patient ID: Estephania Fitzpatrick is a 85 y.o. female who presents for  follow up of her bilateral leg wounds, cellulitis.    HPI     This is an 85 years old Welsh speaking lady with medical history significant for hypertension, asthma, COPD, hypothyroidism, diabetes diet controlled, obesity, arthralgia, degenerative joint disease, hyperlipidemia, anxiety, depression, urinary incontinence, s/p R carpal tunnel syndrome repair, s/p Hospitalization to [facility] 1/24/2019 to 11/25/2019 for CHF exacerbation, s/p ED visit 8/14/2022, s/p hospitalization for shortness of breath, asthma exacerbation, COVID, COPD exacerbation 11/25/2022 to 11/29/2022, s/p ED visit for Fall episode 6/20/2024, S/p Hospitalization to [facility] 3/4/2025 to 3/5/2025 for palpitations, CARMINA, Heart failure with preserved ejection Fraction.      Patient is  presented for evaluation and treatment of the above complaints.  Taking diuretics, some improvement.  Start to have SN services, having dressing changes every 2 days.   L leg blister is improved.  Has constipations.  Blood pressure is better controlled.     Stated, that has been having low back pain, difficulty to ambulate.     Has been having pain, taking Tylenol.     Stated, the blood glucose is stable, well controlled.  Has no issues with blood pressure control.  Has been having fatigue and malaise.  Depressed.  Has episodes of urinary incontinence.    Review of Systems   Cardiovascular:  Positive for leg swelling.   Musculoskeletal:  Positive for arthralgias and back pain.   Skin:  Positive for color change, rash and wound.   Neurological:  Positive for light-headedness.       Objective   /78   Pulse 64   Temp 36.6 °C (97.8 °F) (Temporal)   Resp 16   Wt 112 kg (248 lb)   BMI 51.83 kg/m²     Physical Exam  Constitutional:       Appearance: She is obese.   HENT:      Head: Atraumatic.      Mouth/Throat:      Mouth: Mucous membranes are moist.   Cardiovascular:  "     Rate and Rhythm: Normal rate and regular rhythm.      Heart sounds: Normal heart sounds.   Pulmonary:      Breath sounds: Normal breath sounds.   Abdominal:      Palpations: Abdomen is soft.   Skin:     General: Skin is warm.      Findings: Erythema and lesion present.   Neurological:      Mental Status: She is alert. Mental status is at baseline.   Psychiatric:         Mood and Affect: Mood normal.         Assessment/Plan   Problem List Items Addressed This Visit           ICD-10-CM    Stage 3b chronic kidney disease (Multi) N18.32    Relevant Orders    Comprehensive Metabolic Panel     Other Visit Diagnoses         Codes      Open wound of both lower extremities with complication, sequela    -  Primary S81.801S, S81.802S    Relevant Medications    silver-calcium alginate 6 X 6 \" bandage    doxycycline (Vibramycin) 100 mg capsule               Bilateral leg edema.  L leg blisters.  Right leg chronic skin changes.  Patient bilateral legs were cleaned, right leg dead skin has been removed, dressing changes with Xeroform has been applied bilaterally.  Keep your legs elevated.  Continue with  furosemide 40 mg daily.  Hold on Benicar, when taking furosemide.  Start on Doxycycline.     S/p Hospitalization to [facility] 3/4/2025 to 3/5/2025 for palpitations, CARMINA, Heart failure with preserved ejection Fraction.  Slowly recovered, but still has bilateral leg edema, some shortness of breath.     S/p CARMINA.  Creat on admission was 2.56, GFR 18, discharge 1.51 GFR 34.  Repeat now.     Asthma/ COPD exacerbation.  Much improved.  Trial of Trelegy respiratory therapy.  Use inhalers.  Chest X ray.      R leg dermatitis.  Use moisturizing cream.     S/p ED visit 6/20/2024 for Fall episode.  Has had CT scan done neck, LS spine.  No evidence of Fx.  Gait training.  Tylenol as needed.     - Degenerative joint disease.  Low back pain.  Take Tylenol as needed.  Exercises much as you can.  Consider physical therapy.     Bilateral " knee pain.  Due to R knee injection/ DR Kumar.     Gastroesophageal reflux disease.  Worse now.  Eat small portions.  Avoid Caffeine, spicy foods, chocolate, alcohol.  Do not wear tight clothes.  Keep last meal before bed time > 3 hours.  Keep head side of the bed elevated at all time.  Resume  Nexium.     -Gout.  Controlled now.  Continue to take allopurinol.  Keep a strict diet.  Avoid meat, legumes, alcohol.     - Diabetes type 2.  Well-controlled now.  Check your blood glucose daily.  Exercise, weight loss, diet.  Continue Januvia 50 mg daily, stop Metformin.  Microalbumin, Hb A1C.     - Hypothyroidism.  Continue with Synthroid 150 mcg 30 min before breakfast for now.    - HTN.   Stable now.   Avoid salt.     - H of  hospitalization [facility] for shortness of breath, asthma exacerbation, COVID, COPD exacerbation 11/25/2022 to 11/29/2022.   Avoid infection.  Advised to continue Current therapy.  Discussed with patient about medications administration.     - H of R thigh skin burn 2-3 degree.  Healed.     - Left leg varicosities.  Use compressive stockings daily.     - Spinal stenosis.  Low back pain.  PT, exercise.  Take Tylenol as needed.      -Obstructive sleep apnea.  Has mask at home, but unable to use it.     - H of right carpal tunnel syndrome repair.  Healed, improved.  Follow-up with orthopedic surgery as needed.     -Depression, anxiety.  Follow-up with psychiatry.  Not taking any medications now.       - Urinary incontinence.  Protective wear.     -Health maintenance.  Medicare Wellness Annual exam is  today.  Declined vaccinations.  GYN exam, mammography declined.     - H of  Fall episode, s/p ED visit 8/14/2022.   Stable, improved.     Unsteady gait.  Use walker with wheels.     CKD .  Last creatinine 2.2.  Avoid nephrotoxic agents.  Fluids.  Repeat BW today.     Hyperlipidemia.   Keep Mediterranean diet.  Exercise,diet.  Repeat today.     -Morbid Obesity with BMI is  51.8335  Patient is  keeping diet, cannot exercise, due to bilateral knee pain.  Encouraged ambulation.  Exercise, keep your portions small.        Patient has blisters of her L leg, has severe edema, chronic skin changes of right leg.  Will greatly benefit to have SN  at home to help with dressing changes, disease teaching, control of his symptoms.  Patient will need help with medications administration.

## 2025-06-04 LAB
ALBUMIN SERPL BCP-MCNC: 3.7 G/DL (ref 3.4–5)
ALP SERPL-CCNC: 109 U/L (ref 45–117)
ALT SERPL W P-5'-P-CCNC: 21 U/L (ref 16–63)
ANION GAP SERPL CALC-SCNC: 8 MMOL/L (ref 10–20)
AST SERPL W P-5'-P-CCNC: 17 U/L (ref 15–37)
BILIRUB SERPL-MCNC: 0.3 MG/DL (ref 0.2–1)
BUN SERPL-MCNC: 35 MG/DL (ref 7–18)
CALCIUM SERPL-MCNC: 9.1 MG/DL (ref 8.5–10.1)
CHLORIDE SERPL-SCNC: 101 MMOL/L (ref 98–107)
CO2 SERPL-SCNC: 33 MMOL/L (ref 21–32)
CREAT SERPL-MCNC: 1.74 MG/DL (ref 0.6–1.1)
EGFRCR SERPLBLD CKD-EPI 2021: 29 ML/MIN/1.73M*2
GLUCOSE SERPL-MCNC: 100 MG/DL (ref 74–100)
POTASSIUM SERPL-SCNC: 5.5 MMOL/L (ref 3.5–5.1)
PROT SERPL-MCNC: 6.9 G/DL (ref 6.4–8.2)
SODIUM SERPL-SCNC: 136 MMOL/L (ref 136–145)

## 2025-06-09 ENCOUNTER — APPOINTMENT (OUTPATIENT)
Dept: PRIMARY CARE | Facility: CLINIC | Age: 85
End: 2025-06-09
Payer: MEDICARE

## 2025-06-09 VITALS
TEMPERATURE: 97.5 F | RESPIRATION RATE: 16 BRPM | WEIGHT: 248 LBS | SYSTOLIC BLOOD PRESSURE: 118 MMHG | DIASTOLIC BLOOD PRESSURE: 64 MMHG | BODY MASS INDEX: 51.83 KG/M2 | HEART RATE: 62 BPM

## 2025-06-09 DIAGNOSIS — L30.9 DERMATITIS: ICD-10-CM

## 2025-06-09 DIAGNOSIS — S81.802S WOUND OF LEFT LOWER EXTREMITY, SEQUELA: Primary | ICD-10-CM

## 2025-06-09 RX ORDER — POVIDONE-IODINE 10 %
1 SOLUTION, NON-ORAL TOPICAL AS NEEDED
Qty: 450 ML | Refills: 0 | Status: SHIPPED | OUTPATIENT
Start: 2025-06-09 | End: 2025-07-09

## 2025-06-09 NOTE — PROGRESS NOTES
Subjective   Patient ID: Estephania Fitzpatrick is a 85 y.o. female who presents for  follow up of L  and R led wounds.  Taking diuretics.  Edema is improving.  Patient also has visiting nurse coming twice a week for dressing changes.  Taking antibiotics.    HPI     This is an 85 years old Estonian speaking lady with medical history significant for hypertension, asthma, COPD, hypothyroidism, diabetes diet controlled, obesity, arthralgia, degenerative joint disease, hyperlipidemia, anxiety, depression, urinary incontinence, s/p R carpal tunnel syndrome repair, s/p Hospitalization to [facility] 1/24/2019 to 11/25/2019 for CHF exacerbation, s/p ED visit 8/14/2022, s/p hospitalization for shortness of breath, asthma exacerbation, COVID, COPD exacerbation 11/25/2022 to 11/29/2022, s/p ED visit for Fall episode 6/20/2024, S/p Hospitalization to [facility] 3/4/2025 to 3/5/2025 for palpitations, CARMINA, Heart failure with preserved ejection Fraction.      Patient is  presented for evaluation and treatment of the above complaints.  Taking diuretics, some improvement.  Patient has  SN services, having dressing changes every 2 days.   L leg blister is improved.  Edema is improving.  Has constipations.  Blood pressure is better controlled.     Stated, that has been having low back pain, difficulty to ambulate.     Has been having pain, taking Tylenol.     Stated, the blood glucose is stable, well controlled.  Has no issues with blood pressure control.  Has been having fatigue and malaise.  Depressed.  Has episodes of urinary incontinence.    Review of Systems   Cardiovascular:  Positive for leg swelling.   Musculoskeletal:  Positive for arthralgias, back pain and gait problem.   Skin:  Positive for color change, rash and wound.   Psychiatric/Behavioral:  Positive for sleep disturbance. The patient is nervous/anxious.        Objective   /64   Pulse 62   Temp 36.4 °C (97.5 °F) (Temporal)   Resp 16   Wt 112 kg (248  lb)   BMI 51.83 kg/m²     Physical Exam  Constitutional:       Appearance: She is obese.   HENT:      Head: Normocephalic and atraumatic.   Cardiovascular:      Rate and Rhythm: Normal rate and regular rhythm.   Pulmonary:      Breath sounds: Normal breath sounds.   Abdominal:      Palpations: Abdomen is soft.   Skin:     General: Skin is warm.      Findings: Erythema and lesion present.   Neurological:      Mental Status: Mental status is at baseline.   Psychiatric:         Mood and Affect: Mood normal.         Assessment/Plan   Problem List Items Addressed This Visit           ICD-10-CM    Dermatitis L30.9    Relevant Medications    eucerin cream     Other Visit Diagnoses         Codes      Wound of left lower extremity, sequela    -  Primary S81.802S    Relevant Medications    povidone-iodine (Betadine) 10 % topical solution            Bilateral leg edema.  L leg blisters.  Bilateral leg chronic skin changes.  Patient bilateral legs skin  were cleaned,  dead skin has been removed, dressing changes with Xeroform has been applied bilaterally.  Keep your legs elevated.  Continue with  furosemide 40 mg daily.  Hold on Benicar, when taking furosemide.  Continue with  Doxycycline.     S/p Hospitalization to [facility] 3/4/2025 to 3/5/2025 for palpitations, CARMINA, Heart failure with preserved ejection Fraction.  Slowly recovered, but still has bilateral leg edema, some shortness of breath.     S/p CARMINA.  Creat on admission was 2.56, GFR 18, discharge 1.51 GFR 34.  Repeat now.     Asthma/ COPD exacerbation.  Much improved.  Trial of Trelegy respiratory therapy.  Use inhalers.  Chest X ray.      R leg dermatitis.  Use moisturizing cream.     S/p ED visit 6/20/2024 for Fall episode.  Has had CT scan done neck, LS spine.  No evidence of Fx.  Gait training.  Tylenol as needed.     - Degenerative joint disease.  Low back pain.  Take Tylenol as needed.  Exercises much as you can.  Consider physical therapy.     Bilateral  knee pain.  Due to R knee injection/ DR Kumar.     Gastroesophageal reflux disease.  Worse now.  Eat small portions.  Avoid Caffeine, spicy foods, chocolate, alcohol.  Do not wear tight clothes.  Keep last meal before bed time > 3 hours.  Keep head side of the bed elevated at all time.  Resume  Nexium.     -Gout.  Controlled now.  Continue to take allopurinol.  Keep a strict diet.  Avoid meat, legumes, alcohol.     - Diabetes type 2.  Well-controlled now.  Check your blood glucose daily.  Exercise, weight loss, diet.  Continue Januvia 50 mg daily, stop Metformin.  Microalbumin, Hb A1C.     - Hypothyroidism.  Continue with Synthroid 150 mcg 30 min before breakfast for now.    - HTN.   Stable now.   Avoid salt.     - H of  hospitalization [facility] for shortness of breath, asthma exacerbation, COVID, COPD exacerbation 11/25/2022 to 11/29/2022.   Avoid infection.  Advised to continue Current therapy.  Discussed with patient about medications administration.     - H of R thigh skin burn 2-3 degree.  Healed.     - Left leg varicosities.  Use compressive stockings daily.     - Spinal stenosis.  Low back pain.  PT, exercise.  Take Tylenol as needed.      -Obstructive sleep apnea.  Has mask at home, but unable to use it.     - H of right carpal tunnel syndrome repair.  Healed, improved.  Follow-up with orthopedic surgery as needed.     -Depression, anxiety.  Follow-up with psychiatry.  Not taking any medications now.       - Urinary incontinence.  Protective wear.     -Health maintenance.  Medicare Wellness Annual exam is  today.  Declined vaccinations.  GYN exam, mammography declined.     - H of  Fall episode, s/p ED visit 8/14/2022.   Stable, improved.     Unsteady gait.  Use walker with wheels.     CKD .  Last creatinine 2.2.  Avoid nephrotoxic agents.  Fluids.  Repeat BW today.     Hyperlipidemia.   Keep Mediterranean diet.  Exercise,diet.  Repeat today.     -Morbid Obesity with BMI is  51.83   Patient is keeping  diet, cannot exercise, due to bilateral knee pain.  Encouraged ambulation.  Exercise, keep your portions small.        Patient has blisters of her L leg, has severe edema, chronic skin changes of right leg.  Will greatly benefit to have SN  at home to help with dressing changes, disease teaching, control of his symptoms.  Patient will need help with medications administration.    Return in 1 week.

## 2025-06-10 ASSESSMENT — ENCOUNTER SYMPTOMS
NERVOUS/ANXIOUS: 1
ARTHRALGIAS: 1
SLEEP DISTURBANCE: 1
COLOR CHANGE: 1
WOUND: 1
BACK PAIN: 1

## 2025-06-16 ENCOUNTER — APPOINTMENT (OUTPATIENT)
Dept: PRIMARY CARE | Facility: CLINIC | Age: 85
End: 2025-06-16
Payer: MEDICARE

## 2025-06-23 ENCOUNTER — APPOINTMENT (OUTPATIENT)
Dept: PRIMARY CARE | Facility: CLINIC | Age: 85
End: 2025-06-23
Payer: MEDICARE

## 2025-06-23 VITALS
HEART RATE: 64 BPM | RESPIRATION RATE: 16 BRPM | DIASTOLIC BLOOD PRESSURE: 68 MMHG | BODY MASS INDEX: 52.25 KG/M2 | TEMPERATURE: 97.2 F | WEIGHT: 250 LBS | SYSTOLIC BLOOD PRESSURE: 102 MMHG

## 2025-06-23 DIAGNOSIS — N18.32 STAGE 3B CHRONIC KIDNEY DISEASE (MULTI): ICD-10-CM

## 2025-06-23 DIAGNOSIS — M10.9 GOUT, UNSPECIFIED CAUSE, UNSPECIFIED CHRONICITY, UNSPECIFIED SITE: Primary | ICD-10-CM

## 2025-06-23 DIAGNOSIS — E78.2 HYPERLIPIDEMIA, MIXED: ICD-10-CM

## 2025-06-23 DIAGNOSIS — D50.9 IRON DEFICIENCY ANEMIA, UNSPECIFIED IRON DEFICIENCY ANEMIA TYPE: ICD-10-CM

## 2025-06-23 DIAGNOSIS — K21.9 GASTROESOPHAGEAL REFLUX DISEASE, UNSPECIFIED WHETHER ESOPHAGITIS PRESENT: ICD-10-CM

## 2025-06-23 LAB
ALBUMIN SERPL BCP-MCNC: 3.4 G/DL (ref 3.4–5)
ALP SERPL-CCNC: 115 U/L (ref 45–117)
ALT SERPL W P-5'-P-CCNC: 21 U/L (ref 16–63)
ANION GAP SERPL CALC-SCNC: 13 MMOL/L (ref 10–20)
AST SERPL W P-5'-P-CCNC: 16 U/L (ref 15–37)
BASOPHILS # BLD AUTO: 0.03 X10*3/UL (ref 0.1–1.6)
BASOPHILS NFR BLD AUTO: 0.39 % (ref 0–0.3)
BILIRUB SERPL-MCNC: 0.4 MG/DL (ref 0.2–1)
BUN SERPL-MCNC: 28 MG/DL (ref 7–18)
CALCIUM SERPL-MCNC: 9.2 MG/DL (ref 8.5–10.1)
CHLORIDE SERPL-SCNC: 103 MMOL/L (ref 98–107)
CO2 SERPL-SCNC: 33 MMOL/L (ref 21–32)
CREAT SERPL-MCNC: 1.4 MG/DL (ref 0.6–1.1)
EGFRCR SERPLBLD CKD-EPI 2021: 37 ML/MIN/1.73M*2
EOSINOPHIL # BLD AUTO: 0.16 X10*3/UL (ref 0.04–0.5)
EOSINOPHIL NFR BLD AUTO: 2.25 % (ref 0.7–7)
ERYTHROCYTE [DISTWIDTH] IN BLOOD BY AUTOMATED COUNT: 15.1 % (ref 11.5–14.5)
GLUCOSE SERPL-MCNC: 125 MG/DL (ref 74–100)
HCT VFR BLD AUTO: 37.8 % (ref 36.6–46.6)
HGB BLD-MCNC: 12.72 G/DL (ref 12–15.4)
LYMPHOCYTES # BLD AUTO: 1.95 X10*3/UL (ref 0–6)
LYMPHOCYTES NFR BLD AUTO: 27.65 % (ref 20.5–51.1)
MCH RBC QN AUTO: 31.2 PG (ref 26–32)
MCHC RBC AUTO-ENTMCNC: 33.7 G/DL (ref 31–38)
MCV RBC AUTO: 92.7 FL (ref 80–96)
MONOCYTES # BLD AUTO: 0.67 X10*3/UL (ref 1.6–24.9)
MONOCYTES NFR BLD AUTO: 9.57 % (ref 1.7–9.3)
NEUTROPHILS # BLD AUTO: 4.24 X10*3/UL (ref 1.4–6.5)
NEUTROPHILS NFR BLD AUTO: 60.14 % (ref 42.2–75.2)
PLATELET # BLD AUTO: 217.8 X10*3/UL (ref 150–450)
PMV BLD AUTO: 7.91 FL (ref 7.8–11)
POTASSIUM SERPL-SCNC: 4.5 MMOL/L (ref 3.5–5.1)
PROT SERPL-MCNC: 6 G/DL (ref 6.4–8.2)
RBC # BLD AUTO: 4.08 X10*6/UL (ref 3.9–5.3)
SODIUM SERPL-SCNC: 144 MMOL/L (ref 136–145)
WBC # BLD AUTO: 7.05 X10*3/UL (ref 4.5–10.5)

## 2025-06-23 PROCEDURE — 1126F AMNT PAIN NOTED NONE PRSNT: CPT | Performed by: INTERNAL MEDICINE

## 2025-06-23 PROCEDURE — 85025 COMPLETE CBC W/AUTO DIFF WBC: CPT | Performed by: INTERNAL MEDICINE

## 2025-06-23 PROCEDURE — 3078F DIAST BP <80 MM HG: CPT | Performed by: INTERNAL MEDICINE

## 2025-06-23 PROCEDURE — 99214 OFFICE O/P EST MOD 30 MIN: CPT | Performed by: INTERNAL MEDICINE

## 2025-06-23 PROCEDURE — 1036F TOBACCO NON-USER: CPT | Performed by: INTERNAL MEDICINE

## 2025-06-23 PROCEDURE — G2211 COMPLEX E/M VISIT ADD ON: HCPCS | Performed by: INTERNAL MEDICINE

## 2025-06-23 PROCEDURE — 1160F RVW MEDS BY RX/DR IN RCRD: CPT | Performed by: INTERNAL MEDICINE

## 2025-06-23 PROCEDURE — 80053 COMPREHEN METABOLIC PANEL: CPT | Performed by: INTERNAL MEDICINE

## 2025-06-23 PROCEDURE — 1159F MED LIST DOCD IN RCRD: CPT | Performed by: INTERNAL MEDICINE

## 2025-06-23 PROCEDURE — 3074F SYST BP LT 130 MM HG: CPT | Performed by: INTERNAL MEDICINE

## 2025-06-23 RX ORDER — ESOMEPRAZOLE MAGNESIUM 40 MG/1
40 CAPSULE, DELAYED RELEASE ORAL
Qty: 30 CAPSULE | Refills: 6 | Status: SHIPPED | OUTPATIENT
Start: 2025-06-23 | End: 2025-07-23

## 2025-06-23 ASSESSMENT — ENCOUNTER SYMPTOMS
WOUND: 1
ARTHRALGIAS: 1

## 2025-06-23 ASSESSMENT — PAIN SCALES - GENERAL: PAINLEVEL_OUTOF10: 0-NO PAIN

## 2025-06-23 NOTE — PROGRESS NOTES
Subjective   Patient ID: Estephania Fitzpatrick is a 85 y.o. female who presents for  follow up of her leg wounds.  Concerned about kidney function tests.  Taking now furosemide 40 mg daily.    HPI     This is an 85 years old [place] speaking lady with medical history significant for hypertension, asthma, COPD, hypothyroidism, diabetes diet controlled, obesity, arthralgia, degenerative joint disease, hyperlipidemia, anxiety, depression, urinary incontinence, s/p R carpal tunnel syndrome repair, s/p Hospitalization to [facility] 1/24/2019 to 11/25/2019 for CHF exacerbation, s/p ED visit 8/14/2022, s/p hospitalization for shortness of breath, asthma exacerbation, COVID, COPD exacerbation 11/25/2022 to 11/29/2022, s/p ED visit for Fall episode 6/20/2024, S/p Hospitalization to [facility] 3/4/2025 to 3/5/2025 for palpitations, CARMINA, Heart failure with preserved ejection Fraction.      Patient is presented for evaluation and treatment of the above complaints.  Taking diuretics, some improvement.  Patient has  SN services, having dressing changes every 2 days.   L leg blister is improved.  Edema is improving.  Has constipations.  Blood pressure is better controlled.     Stated, that has been having low back pain, difficulty to ambulate.     Has been having pain, taking Tylenol.     Stated, the blood glucose is stable, well controlled.  Has no issues with blood pressure control.  Has been having fatigue and malaise.  Depressed.  Has episodes of urinary incontinence.    Review of Systems   Cardiovascular:  Positive for leg swelling. Negative for chest pain.   Musculoskeletal:  Positive for arthralgias and gait problem.   Skin:  Positive for wound.       Objective   /68   Pulse 64   Temp 36.2 °C (97.2 °F) (Temporal)   Resp 16   Wt 113 kg (250 lb)   BMI 52.25 kg/m²     Physical Exam  Constitutional:       Appearance: She is obese.   HENT:      Head: Normocephalic.   Cardiovascular:      Rate and Rhythm:  Normal rate and regular rhythm.      Heart sounds: Normal heart sounds.   Pulmonary:      Breath sounds: Normal breath sounds.   Abdominal:      Palpations: Abdomen is soft.   Neurological:      Mental Status: Mental status is at baseline.   Psychiatric:         Mood and Affect: Mood normal.         Assessment/Plan   Problem List Items Addressed This Visit           ICD-10-CM    Esophageal reflux K21.9    Relevant Medications    esomeprazole (NexIUM) 40 mg DR capsule            Bilateral leg edema.  L leg wound is healed.  R LLE 2 open areas.     Patient L leg skin  were cleaned,  dead skin has been removed, dressing changes with Xeroform has been applied bilaterally.  Keep your legs elevated.  Continue with  furosemide 40 mg daily.  Hold on Benicar, when taking furosemide.  Completed Doxycycline.     S/p Hospitalization to [facility] 3/4/2025 to 3/5/2025 for palpitations, CARMINA, Heart failure with preserved ejection Fraction.  Slowly recovered, but still has bilateral leg edema, some shortness of breath.     S/p CARMINA.  Creat on admission was 2.56, GFR 18, discharge 1.51 GFR 34.  Repeat now.     Asthma/ COPD exacerbation.  Much improved.  Trial of Trelegy respiratory therapy.  Use inhalers.  Chest X ray.      R leg dermatitis.  Use moisturizing cream.     S/p ED visit 6/20/2024 for Fall episode.  Has had CT scan done neck, LS spine.  No evidence of Fx.  Gait training.  Tylenol as needed.     - Degenerative joint disease.  Low back pain.  Take Tylenol as needed.  Exercises much as you can.  Consider physical therapy.     Bilateral knee pain.  Due to R knee injection/ DR Kumar.     Gastroesophageal reflux disease.  Worse now.  Eat small portions.  Avoid Caffeine, spicy foods, chocolate, alcohol.  Do not wear tight clothes.  Keep last meal before bed time > 3 hours.  Keep head side of the bed elevated at all time.  Resume  Nexium.     -Gout.  Controlled now.  Continue to take allopurinol.  Keep a strict diet.  Avoid  meat, legumes, alcohol.     - Diabetes type 2.  Well-controlled now.  Check your blood glucose daily.  Exercise, weight loss, diet.  Continue Januvia 50 mg daily, stop Metformin.  Microalbumin, Hb A1C.     - Hypothyroidism.  Continue with Synthroid 150 mcg 30 min before breakfast for now.    - HTN.   Stable now.   Avoid salt.     - H of  hospitalization [facility] for shortness of breath, asthma exacerbation, COVID, COPD exacerbation 11/25/2022 to 11/29/2022.   Avoid infection.  Advised to continue Current therapy.  Discussed with patient about medications administration.     - H of R thigh skin burn 2-3 degree.  Healed.     - Left leg varicosities.  Use compressive stockings daily.     - Spinal stenosis.  Low back pain.  PT, exercise.  Take Tylenol as needed.      -Obstructive sleep apnea.  Has mask at home, but unable to use it.     - H of right carpal tunnel syndrome repair.  Healed, improved.  Follow-up with orthopedic surgery as needed.     -Depression, anxiety.  Follow-up with psychiatry.  Not taking any medications now.       - Urinary incontinence.  Protective wear.     -Health maintenance.  Medicare Wellness Annual exam is  today.  Declined vaccinations.  GYN exam, mammography declined.     - H of  Fall episode, s/p ED visit 8/14/2022.   Stable, improved.     Unsteady gait.  Use walker with wheels.     CKD .  Last creatinine 2.2.  Avoid nephrotoxic agents.  Fluids.  Repeat BW today.     Hyperlipidemia.   Keep Mediterranean diet.  Exercise,diet.  Repeat today.     -Morbid Obesity with BMI is  51.83   Patient is keeping diet, cannot exercise, due to bilateral knee pain.  Encouraged ambulation.  Exercise, keep your portions small.        Patient has blisters of her r leg, has  edema, chronic skin changes of right leg.  Will greatly benefit to have SN  at home to help with dressing changes, disease teaching, control of his symptoms.  Patient will need help with medications administration.     Return in 1  week.

## 2025-06-24 ENCOUNTER — APPOINTMENT (OUTPATIENT)
Dept: PRIMARY CARE | Facility: CLINIC | Age: 85
End: 2025-06-24
Payer: MEDICARE

## 2025-06-24 LAB — URATE SERPL-MCNC: 7.5 MG/DL (ref 2.5–7)

## 2025-06-25 DIAGNOSIS — L30.9 DERMATITIS: Primary | ICD-10-CM

## 2025-06-25 RX ORDER — MUPIROCIN 20 MG/G
OINTMENT TOPICAL 2 TIMES DAILY
Qty: 30 G | Refills: 6 | Status: SHIPPED | OUTPATIENT
Start: 2025-06-25 | End: 2025-07-25

## 2025-07-01 ENCOUNTER — APPOINTMENT (OUTPATIENT)
Dept: PRIMARY CARE | Facility: CLINIC | Age: 85
End: 2025-07-01
Payer: MEDICARE

## 2025-07-01 DIAGNOSIS — L30.9 DERMATITIS: Primary | ICD-10-CM

## 2025-07-01 DIAGNOSIS — S81.802S OPEN WOUND OF BOTH LOWER EXTREMITIES WITH COMPLICATION, SEQUELA: ICD-10-CM

## 2025-07-01 DIAGNOSIS — S81.801S OPEN WOUND OF BOTH LOWER EXTREMITIES WITH COMPLICATION, SEQUELA: ICD-10-CM

## 2025-07-01 RX ORDER — ACETAMINOPHEN 500 MG
1 TABLET ORAL
COMMUNITY
Start: 2025-06-23

## 2025-07-01 ASSESSMENT — PAIN SCALES - GENERAL: PAINLEVEL_OUTOF10: 0-NO PAIN

## 2025-07-01 NOTE — PROGRESS NOTES
Subjective   Patient ID: Estephania Fitzpatrick is a 85 y.o. female who presents for  follow up of bilateral  leg wounds.    HPI '    This is an 85 years old [place] speaking lady with medical history significant for hypertension, asthma, COPD, hypothyroidism, diabetes diet controlled, obesity, arthralgia, degenerative joint disease, hyperlipidemia, anxiety, depression, urinary incontinence, s/p R carpal tunnel syndrome repair, s/p Hospitalization to [facility] 1/24/2019 to 11/25/2019 for CHF exacerbation, s/p ED visit 8/14/2022, s/p hospitalization for shortness of breath, asthma exacerbation, COVID, COPD exacerbation 11/25/2022 to 11/29/2022, s/p ED visit for Fall episode 6/20/2024, S/p Hospitalization to [facility] 3/4/2025 to 3/5/2025 for palpitations, CARMINA, Heart failure with preserved ejection Fraction.      Patient is presented for follow up of her bilateral  leg wounds.  Taking diuretics, some improvement.  Patient has  SN services, having dressing changes every 2 days.   L leg blister is improved, still has open area.  Edema is improving.  Has constipations.  Blood pressure is better controlled.     Stated, that has been having low back pain, difficulty to ambulate.     Has been having pain, taking Tylenol.     Stated, the blood glucose is stable, well controlled.  Has no issues with blood pressure control.  Has been having fatigue and malaise.  Depressed.  Has episodes of urinary incontinence.    Review of Systems   Constitutional:  Positive for activity change and fatigue.   Respiratory:  Positive for cough and shortness of breath.    Cardiovascular:  Positive for leg swelling.       Objective   /62   Pulse 64   Temp 36.2 °C (97.1 °F) (Temporal)   Resp 16   Wt 111 kg (245 lb)   BMI 51.21 kg/m²     Physical Exam  Constitutional:       Appearance: She is obese.   HENT:      Nose: Nose normal.   Cardiovascular:      Rate and Rhythm: Normal rate and regular rhythm.      Heart sounds: Normal  heart sounds.   Pulmonary:      Breath sounds: Normal breath sounds.   Abdominal:      Palpations: Abdomen is soft.   Skin:     Findings: Erythema, lesion and rash present.   Psychiatric:         Thought Content: Thought content normal.         Assessment/Plan   Problem List Items Addressed This Visit           ICD-10-CM    Dermatitis - Primary L30.9     Other Visit Diagnoses         Codes      Open wound of both lower extremities with complication, sequela     S81.801S, S81.802S               Bilateral leg edema.  L leg wound has 1 open area.  R LLE 2 open areas.     Patient L leg skin  were cleaned,  dead skin has been removed, dressing changes with Xeroform has been applied bilaterally.  Keep your legs elevated.  Continue with  furosemide 40 mg daily.  Hold on Benicar, when taking furosemide.  Completed Doxycycline.     S/p Hospitalization to [facility] 3/4/2025 to 3/5/2025 for palpitations, CARMINA, Heart failure with preserved ejection Fraction.  Slowly recovered, but still has bilateral leg edema, some shortness of breath.     S/p CARMINA.  Creat on admission was 2.56, GFR 18, discharge 1.51 GFR 34.  Repeat now.     Asthma/ COPD exacerbation.  Much improved.  Trial of Trelegy respiratory therapy.  Use inhalers.  Chest X ray.      R leg dermatitis.  Use moisturizing cream.     S/p ED visit 6/20/2024 for Fall episode.  Has had CT scan done neck, LS spine.  No evidence of Fx.  Gait training.  Tylenol as needed.     - Degenerative joint disease.  Low back pain.  Take Tylenol as needed.  Exercises much as you can.  Consider physical therapy.     Bilateral knee pain.  Due to R knee injection/ DR Kumar.     Gastroesophageal reflux disease.  Worse now.  Eat small portions.  Avoid Caffeine, spicy foods, chocolate, alcohol.  Do not wear tight clothes.  Keep last meal before bed time > 3 hours.  Keep head side of the bed elevated at all time.  Resume  Nexium.     -Gout.  Controlled now.  Continue to take allopurinol.  Keep  a strict diet.  Avoid meat, legumes, alcohol.     - Diabetes type 2.  Well-controlled now.  Check your blood glucose daily.  Exercise, weight loss, diet.  Continue Januvia 50 mg daily, stop Metformin.  Microalbumin, Hb A1C.     - Hypothyroidism.  Continue with Synthroid 150 mcg 30 min before breakfast for now.    - HTN.   Stable now.   Avoid salt.     - H of  hospitalization [facility] for shortness of breath, asthma exacerbation, COVID, COPD exacerbation 11/25/2022 to 11/29/2022.   Avoid infection.  Advised to continue Current therapy.  Discussed with patient about medications administration.     - H of R thigh skin burn 2-3 degree.  Healed.     - Left leg varicosities.  Use compressive stockings daily.     - Spinal stenosis.  Low back pain.  PT, exercise.  Take Tylenol as needed.      -Obstructive sleep apnea.  Has mask at home, but unable to use it.     - H of right carpal tunnel syndrome repair.  Healed, improved.  Follow-up with orthopedic surgery as needed.     -Depression, anxiety.  Follow-up with psychiatry.  Not taking any medications now.       - Urinary incontinence.  Protective wear.     -Health maintenance.  Medicare Wellness Annual exam is  today.  Declined vaccinations.  GYN exam, mammography declined.     - H of  Fall episode, s/p ED visit 8/14/2022.   Stable, improved.     Unsteady gait.  Use walker with wheels.     CKD .  Last creatinine  1.4  Avoid nephrotoxic agents.  Fluids.        Hyperlipidemia.   Keep Mediterranean diet.  Exercise,diet.  Repeat today.     -Morbid Obesity with BMI is  51.83   Patient is keeping diet, cannot exercise, due to bilateral knee pain.  Encouraged ambulation.  Exercise, keep your portions small.        Patient has blisters of her r leg, has  edema, chronic skin changes of right leg.  Will greatly benefit to have SN  at home to help with dressing changes, disease teaching, control of his symptoms.  Patient will need help with medications administration.     Return  in 1 week.

## 2025-07-04 VITALS
HEART RATE: 64 BPM | TEMPERATURE: 97.1 F | SYSTOLIC BLOOD PRESSURE: 118 MMHG | BODY MASS INDEX: 51.21 KG/M2 | RESPIRATION RATE: 16 BRPM | WEIGHT: 245 LBS | DIASTOLIC BLOOD PRESSURE: 62 MMHG

## 2025-07-04 ASSESSMENT — ENCOUNTER SYMPTOMS
ACTIVITY CHANGE: 1
FATIGUE: 1
SHORTNESS OF BREATH: 1
COUGH: 1

## 2025-07-07 ENCOUNTER — APPOINTMENT (OUTPATIENT)
Dept: PRIMARY CARE | Facility: CLINIC | Age: 85
End: 2025-07-07
Payer: MEDICARE

## 2025-07-07 VITALS
HEART RATE: 62 BPM | TEMPERATURE: 97.9 F | SYSTOLIC BLOOD PRESSURE: 132 MMHG | RESPIRATION RATE: 16 BRPM | WEIGHT: 250 LBS | BODY MASS INDEX: 52.25 KG/M2 | DIASTOLIC BLOOD PRESSURE: 76 MMHG

## 2025-07-07 DIAGNOSIS — S81.801D WOUND OF RIGHT LOWER EXTREMITY, SUBSEQUENT ENCOUNTER: ICD-10-CM

## 2025-07-07 DIAGNOSIS — R60.0 EDEMA OF BOTH LEGS: Primary | ICD-10-CM

## 2025-07-07 ASSESSMENT — ENCOUNTER SYMPTOMS
ARTHRALGIAS: 1
SLEEP DISTURBANCE: 1
NERVOUS/ANXIOUS: 1
FATIGUE: 1
ACTIVITY CHANGE: 1
FREQUENCY: 1
BACK PAIN: 1

## 2025-07-07 ASSESSMENT — PAIN SCALES - GENERAL: PAINLEVEL_OUTOF10: 0-NO PAIN

## 2025-07-07 NOTE — PROGRESS NOTES
Subjective   Patient ID: Estephania Fitzpatrick is a 85 y.o. female who presents for  follow up of her leg wounds.  Has SN at home, changing dressing twice a week/ Wednesday and Friday.  LLE wound is healed.    HPI     This is an 85 years old [place] speaking lady with medical history significant for hypertension, asthma, COPD, hypothyroidism, diabetes diet controlled, obesity, arthralgia, degenerative joint disease, hyperlipidemia, anxiety, depression, urinary incontinence, s/p R carpal tunnel syndrome repair, s/p Hospitalization to [facility] 1/24/2019 to 11/25/2019 for CHF exacerbation, s/p ED visit 8/14/2022, s/p hospitalization for shortness of breath, asthma exacerbation, COVID, COPD exacerbation 11/25/2022 to 11/29/2022, s/p ED visit for Fall episode 6/20/2024, S/p Hospitalization to [facility] 3/4/2025 to 3/5/2025 for palpitations, CARMINA, Heart failure with preserved ejection Fraction.      Patient is presented for follow up of her bilateral  leg wounds.  L leg wound is healed.  Taking diuretics, some improvement.  Patient has  SN services, having dressing changes every 2 days.    Still has R leg open area.    Edema is improving.  Has constipations.  Blood pressure is better controlled.     Stated, that has been having low back pain, difficulty to ambulate.     Has been having pain, taking Tylenol.     Stated, the blood glucose is stable, well controlled.  Has no issues with blood pressure control.  Has been having fatigue and malaise.  Depressed.  Has episodes of urinary incontinence.    Review of Systems   Constitutional:  Positive for activity change and fatigue.   Genitourinary:  Positive for frequency.   Musculoskeletal:  Positive for arthralgias, back pain and gait problem.   Psychiatric/Behavioral:  Positive for sleep disturbance. The patient is nervous/anxious.        Objective   /76   Pulse 62   Temp 36.6 °C (97.9 °F) (Temporal)   Resp 16   Wt 113 kg (250 lb)   BMI 52.25 kg/m²      Physical Exam  Constitutional:       Appearance: She is obese.   HENT:      Head: Normocephalic and atraumatic.      Mouth/Throat:      Mouth: Mucous membranes are moist.   Cardiovascular:      Rate and Rhythm: Normal rate and regular rhythm.   Pulmonary:      Breath sounds: Normal breath sounds.   Musculoskeletal:      Right lower leg: Edema present.      Left lower leg: Edema present.   Skin:     Findings: Erythema and lesion present.   Neurological:      Mental Status: Mental status is at baseline.         Assessment/Plan   Problem List Items Addressed This Visit           ICD-10-CM    Edema of both legs - Primary R60.0     Other Visit Diagnoses         Codes      Wound of right lower extremity, subsequent encounter     S81.801D          Bilateral leg edema.  Taking diuretics daily.  Improved.  L leg wound  is healed.  R LE has 1 open area.  Patient R leg skin  was  cleaned,  dead skin has been removed, dressing changes with Xeroform has been applied bilaterally.  Keep your legs elevated.  Continue with  furosemide 40 mg daily.  Hold on Benicar, when taking furosemide.  Completed Doxycycline.     S/p Hospitalization to [facility] 3/4/2025 to 3/5/2025 for palpitations, CARMINA, Heart failure with preserved ejection Fraction.  Slowly recovered, but still has bilateral leg edema, some shortness of breath.     S/p CARMINA.  Creat on admission was 2.56, GFR 18, discharge 1.51 GFR 34.  Repeat now.     Asthma/ COPD exacerbation.  Much improved.  Trial of Trelegy respiratory therapy.  Use inhalers.  Chest X ray.      R leg dermatitis.  Use moisturizing cream.     S/p ED visit 6/20/2024 for Fall episode.  Has had CT scan done neck, LS spine.  No evidence of Fx.  Gait training.  Tylenol as needed.     - Degenerative joint disease.  Low back pain.  Take Tylenol as needed.  Exercises much as you can.  Consider physical therapy.     Bilateral knee pain.  Due to R knee injection/ DR Kumar.     Gastroesophageal reflux  disease.  Worse now.  Eat small portions.  Avoid Caffeine, spicy foods, chocolate, alcohol.  Do not wear tight clothes.  Keep last meal before bed time > 3 hours.  Keep head side of the bed elevated at all time.  Resume  Nexium.     -Gout.  Controlled now.  Continue to take allopurinol.  Keep a strict diet.  Avoid meat, legumes, alcohol.     - Diabetes type 2.  Well-controlled now.  Check your blood glucose daily.  Exercise, weight loss, diet.  Continue Januvia 50 mg daily, stop Metformin.  Microalbumin, Hb A1C.     - Hypothyroidism.  Continue with Synthroid 150 mcg 30 min before breakfast for now.    - HTN.   Stable now.   Avoid salt.     - H of  hospitalization [facility] for shortness of breath, asthma exacerbation, COVID, COPD exacerbation 11/25/2022 to 11/29/2022.   Avoid infection.  Advised to continue Current therapy.  Discussed with patient about medications administration.     - H of R thigh skin burn 2-3 degree.  Healed.     - Left leg varicosities.  Use compressive stockings daily.     - Spinal stenosis.  Low back pain.  PT, exercise.  Take Tylenol as needed.      -Obstructive sleep apnea.  Has mask at home, but unable to use it.     - H of right carpal tunnel syndrome repair.  Healed, improved.  Follow-up with orthopedic surgery as needed.     -Depression, anxiety.  Follow-up with psychiatry.  Not taking any medications now.       - Urinary incontinence.  Protective wear.     -Health maintenance.  Medicare Wellness Annual exam is  today.  Declined vaccinations.  GYN exam, mammography declined.     - H of  Fall episode, s/p ED visit 8/14/2022.   Stable, improved.     Unsteady gait.  Use walker with wheels.     CKD .  Last creatinine  1.4  Avoid nephrotoxic agents.  Fluids.        Hyperlipidemia.   Keep Mediterranean diet.  Exercise,diet.  Repeat today.     -Morbid Obesity with BMI is  52.25.  Patient is keeping diet, cannot exercise, due to bilateral knee pain.  Encouraged ambulation.  Exercise, keep  your portions small.        Patient has blisters of her r leg, has  edema, chronic skin changes of right leg.  Will greatly benefit to have SN  at home to help with dressing changes, disease teaching, control of his symptoms.  Patient will need help with medications administration.

## 2025-07-14 ENCOUNTER — APPOINTMENT (OUTPATIENT)
Dept: PRIMARY CARE | Facility: CLINIC | Age: 85
End: 2025-07-14
Payer: COMMERCIAL

## 2025-07-18 DIAGNOSIS — E55.9 VITAMIN D DEFICIENCY, UNSPECIFIED: ICD-10-CM

## 2025-07-18 RX ORDER — ERGOCALCIFEROL 1.25 MG/1
1.25 CAPSULE ORAL
Qty: 4 CAPSULE | Refills: 2 | Status: SHIPPED | OUTPATIENT
Start: 2025-07-18

## 2025-07-23 ENCOUNTER — APPOINTMENT (OUTPATIENT)
Dept: PRIMARY CARE | Facility: CLINIC | Age: 85
End: 2025-07-23
Payer: COMMERCIAL

## 2025-07-23 DIAGNOSIS — E78.2 HYPERLIPIDEMIA, MIXED: ICD-10-CM

## 2025-07-23 DIAGNOSIS — D50.9 IRON DEFICIENCY ANEMIA, UNSPECIFIED IRON DEFICIENCY ANEMIA TYPE: Primary | ICD-10-CM

## 2025-07-23 DIAGNOSIS — N18.32 STAGE 3B CHRONIC KIDNEY DISEASE (MULTI): ICD-10-CM

## 2025-07-23 LAB
ALT SERPL W P-5'-P-CCNC: 19 U/L (ref 16–63)
ANION GAP SERPL CALC-SCNC: 12 MMOL/L (ref 10–20)
AST SERPL W P-5'-P-CCNC: 19 U/L (ref 15–37)
BASOPHILS # BLD AUTO: 0.03 X10*3/UL (ref 0.1–1.6)
BASOPHILS NFR BLD AUTO: 0.48 % (ref 0–0.3)
BUN SERPL-MCNC: 44 MG/DL (ref 7–18)
CALCIUM SERPL-MCNC: 9.3 MG/DL (ref 8.5–10.1)
CHLORIDE SERPL-SCNC: 100 MMOL/L (ref 98–107)
CO2 SERPL-SCNC: 31 MMOL/L (ref 21–32)
CREAT SERPL-MCNC: 1.61 MG/DL (ref 0.6–1.1)
EGFRCR SERPLBLD CKD-EPI 2021: 32 ML/MIN/1.73M*2
EOSINOPHIL # BLD AUTO: 0.23 X10*3/UL (ref 0.04–0.5)
EOSINOPHIL NFR BLD AUTO: 3.34 % (ref 0.7–7)
ERYTHROCYTE [DISTWIDTH] IN BLOOD BY AUTOMATED COUNT: 14.6 % (ref 11.5–14.5)
GLUCOSE SERPL-MCNC: 114 MG/DL (ref 74–100)
HCT VFR BLD AUTO: 39.3 % (ref 36.6–46.6)
HGB BLD-MCNC: 12.58 G/DL (ref 12–15.4)
LYMPHOCYTES # BLD AUTO: 1.83 X10*3/UL (ref 0–6)
LYMPHOCYTES NFR BLD AUTO: 26.33 % (ref 20.5–51.1)
MCH RBC QN AUTO: 29.7 PG (ref 26–32)
MCHC RBC AUTO-ENTMCNC: 32 G/DL (ref 31–38)
MCV RBC AUTO: 92.8 FL (ref 80–96)
MONOCYTES # BLD AUTO: 0.7 X10*3/UL (ref 1.6–24.9)
MONOCYTES NFR BLD AUTO: 10.01 % (ref 1.7–9.3)
NEUTROPHILS # BLD AUTO: 4.16 X10*3/UL (ref 1.4–6.5)
NEUTROPHILS NFR BLD AUTO: 59.84 % (ref 42.2–75.2)
PLATELET # BLD AUTO: 212.2 X10*3/UL (ref 150–450)
PMV BLD AUTO: 7.92 FL (ref 7.8–11)
POTASSIUM SERPL-SCNC: 4.6 MMOL/L (ref 3.5–5.1)
RBC # BLD AUTO: 4.24 X10*6/UL (ref 3.9–5.3)
SODIUM SERPL-SCNC: 138 MMOL/L (ref 136–145)
WBC # BLD AUTO: 6.95 X10*3/UL (ref 4.5–10.5)

## 2025-07-23 PROCEDURE — 84450 TRANSFERASE (AST) (SGOT): CPT | Performed by: INTERNAL MEDICINE

## 2025-07-23 PROCEDURE — 85025 COMPLETE CBC W/AUTO DIFF WBC: CPT | Performed by: INTERNAL MEDICINE

## 2025-07-23 PROCEDURE — 1160F RVW MEDS BY RX/DR IN RCRD: CPT | Performed by: INTERNAL MEDICINE

## 2025-07-23 PROCEDURE — 80048 BASIC METABOLIC PNL TOTAL CA: CPT | Performed by: INTERNAL MEDICINE

## 2025-07-23 PROCEDURE — 3078F DIAST BP <80 MM HG: CPT | Performed by: INTERNAL MEDICINE

## 2025-07-23 PROCEDURE — 1159F MED LIST DOCD IN RCRD: CPT | Performed by: INTERNAL MEDICINE

## 2025-07-23 PROCEDURE — 1126F AMNT PAIN NOTED NONE PRSNT: CPT | Performed by: INTERNAL MEDICINE

## 2025-07-23 PROCEDURE — 84460 ALANINE AMINO (ALT) (SGPT): CPT | Performed by: INTERNAL MEDICINE

## 2025-07-23 PROCEDURE — 3074F SYST BP LT 130 MM HG: CPT | Performed by: INTERNAL MEDICINE

## 2025-07-23 PROCEDURE — 1036F TOBACCO NON-USER: CPT | Performed by: INTERNAL MEDICINE

## 2025-07-23 PROCEDURE — G2211 COMPLEX E/M VISIT ADD ON: HCPCS | Performed by: INTERNAL MEDICINE

## 2025-07-23 PROCEDURE — 99212 OFFICE O/P EST SF 10 MIN: CPT | Performed by: INTERNAL MEDICINE

## 2025-07-23 ASSESSMENT — PAIN SCALES - GENERAL: PAINLEVEL_OUTOF10: 0-NO PAIN

## 2025-07-23 NOTE — PROGRESS NOTES
Subjective   Patient ID: Estephania Fitzpatrick is a 85 y.o. female who presents for  follow up of leg wounds.  Still has legs edema, patient is moving to different apartment.    HPI         This is an 85 years old Stateless speaking lady with medical history significant for hypertension, asthma, COPD, hypothyroidism, diabetes diet controlled, obesity, arthralgia, degenerative joint disease, hyperlipidemia, anxiety, depression, urinary incontinence, s/p R carpal tunnel syndrome repair, s/p Hospitalization to [facility] 1/24/2019 to 11/25/2019 for CHF exacerbation, s/p ED visit 8/14/2022, s/p hospitalization for shortness of breath, asthma exacerbation, COVID, COPD exacerbation 11/25/2022 to 11/29/2022, s/p ED visit for Fall episode 6/20/2024, S/p Hospitalization to [facility] 3/4/2025 to 3/5/2025 for palpitations, CARMINA, Heart failure with preserved ejection Fraction.      Patient is presented for follow up of her bilateral  leg wounds.  L leg wound is healed.  Taking diuretics, some improvement.  Has edema.  Patient has  SN services, having dressing changes every 2 days.   R keg wound is healed.    Has constipations.  Blood pressure is better controlled.     Stated, that has been having low back pain, difficulty to ambulate.     Has been having pain, taking Tylenol.     Stated, the blood glucose is stable, well controlled.  Has no issues with blood pressure control.  Has been having fatigue and malaise.  Depressed.  Has episodes of urinary incontinence.    Review of Systems   Constitutional:  Positive for activity change and fatigue.   Cardiovascular:  Positive for leg swelling.   Musculoskeletal:  Positive for arthralgias and back pain.       Objective   /68   Pulse 74   Temp 36.6 °C (97.9 °F) (Temporal)   Resp 16   Wt 112 kg (246 lb)   BMI 51.41 kg/m²     Physical Exam  Constitutional:       Appearance: She is obese.   HENT:      Head: Normocephalic and atraumatic.      Right Ear: External ear  normal.      Left Ear: External ear normal.      Nose: Nose normal.      Mouth/Throat:      Mouth: Mucous membranes are moist.     Eyes:      Extraocular Movements: Extraocular movements intact.      Conjunctiva/sclera: Conjunctivae normal.      Pupils: Pupils are equal, round, and reactive to light.       Cardiovascular:      Rate and Rhythm: Normal rate.      Pulses: Normal pulses.      Heart sounds: Normal heart sounds.   Pulmonary:      Effort: Pulmonary effort is normal.      Breath sounds: Normal breath sounds.   Abdominal:      General: Abdomen is flat. Bowel sounds are normal.      Palpations: Abdomen is soft.     Musculoskeletal:         General: Normal range of motion.      Cervical back: Normal range of motion and neck supple.      Right lower leg: Edema present.      Left lower leg: Edema present.     Skin:     General: Skin is warm and dry.     Neurological:      General: No focal deficit present.      Mental Status: She is alert.     Psychiatric:         Mood and Affect: Mood normal.         Behavior: Behavior normal.         Thought Content: Thought content normal.         Judgment: Judgment normal.         Assessment/Plan   Problem List Items Addressed This Visit           ICD-10-CM    Anemia - Primary D64.9    Relevant Orders    CBC w/5 Part Differential, [facility] (Completed)    Iron and TIBC (Completed)    Ferritin (Completed)    Hyperlipidemia, mixed E78.2    Relevant Orders    Aspartate Aminotransferase (Completed)    Alanine Aminotransferase (Completed)    Stage 3b chronic kidney disease (Multi) N18.32    Relevant Orders    Basic Metabolic Panel (Completed)          Bilateral leg edema.  Taking diuretics daily.  Improved.  L leg wound  is healed.  R leg wound is healed.  Continue with  furosemide 40 mg daily.  Hold on Benicar, when taking furosemide.        S/p Hospitalization to [facility] 3/4/2025 to 3/5/2025 for palpitations, CARMINA, Heart failure with preserved ejection  Fraction.  Slowly recovered, but still has bilateral leg edema, some shortness of breath.     S/p CARMINA.  Creat on admission was 2.56, GFR 18, discharge 1.51 GFR 34.  Repeat now.     Asthma/ COPD exacerbation.  Much improved.  Trial of Trelegy respiratory therapy.  Use inhalers.  Chest X ray.      R leg dermatitis.  Use moisturizing cream.     S/p ED visit 6/20/2024 for Fall episode.  Has had CT scan done neck, LS spine.  No evidence of Fx.  Gait training.  Tylenol as needed.     - Degenerative joint disease.  Low back pain.  Take Tylenol as needed.  Exercises much as you can.  Consider physical therapy.     Bilateral knee pain.  Due to R knee injection/ DR Kumar.     Gastroesophageal reflux disease.  Worse now.  Eat small portions.  Avoid Caffeine, spicy foods, chocolate, alcohol.  Do not wear tight clothes.  Keep last meal before bed time > 3 hours.  Keep head side of the bed elevated at all time.  Resume  Nexium.     -Gout.  Controlled now.  Continue to take allopurinol.  Keep a strict diet.  Avoid meat, legumes, alcohol.     - Diabetes type 2.  Well-controlled now.  Check your blood glucose daily.  Exercise, weight loss, diet.  Continue Januvia 50 mg daily, stop Metformin.  Microalbumin, Hb A1C.     - Hypothyroidism.  Continue with Synthroid 150 mcg 30 min before breakfast for now.    - HTN.   Stable now.   Avoid salt.     - H of  hospitalization [facility] for shortness of breath, asthma exacerbation, COVID, COPD exacerbation 11/25/2022 to 11/29/2022.   Avoid infection.  Advised to continue Current therapy.  Discussed with patient about medications administration.     - H of R thigh skin burn 2-3 degree.  Healed.     - Left leg varicosities.  Use compressive stockings daily.     - Spinal stenosis.  Low back pain.  PT, exercise.  Take Tylenol as needed.      -Obstructive sleep apnea.  Has mask at home, but unable to use it.     - H of right carpal tunnel syndrome repair.  Healed, improved.  Follow-up with  orthopedic surgery as needed.     -Depression, anxiety.  Follow-up with psychiatry.  Not taking any medications now.       - Urinary incontinence.  Protective wear.     -Health maintenance.  Medicare Wellness Annual exam is  up to date.  Declined vaccinations.  GYN exam, mammography declined.     - H of  Fall episode, s/p ED visit 8/14/2022.   Stable, improved.     Unsteady gait.  Use walker with wheels.     CKD .  Last creatinine  1.4  Avoid nephrotoxic agents.  Fluids.        Hyperlipidemia.   Keep Mediterranean diet.  Exercise,diet.  Repeat today.     -Morbid Obesity with BMI is  51.41  Patient is keeping diet, cannot exercise, due to bilateral knee pain.  Encouraged ambulation.  Exercise, keep your portions small.

## 2025-07-24 LAB
FERRITIN SERPL-MCNC: 31 NG/ML (ref 16–288)
IRON SATN MFR SERPL: 14 % (CALC) (ref 16–45)
IRON SERPL-MCNC: 52 MCG/DL (ref 45–160)
TIBC SERPL-MCNC: 380 MCG/DL (CALC) (ref 250–450)

## 2025-07-26 VITALS
DIASTOLIC BLOOD PRESSURE: 68 MMHG | WEIGHT: 246 LBS | BODY MASS INDEX: 51.41 KG/M2 | HEART RATE: 74 BPM | SYSTOLIC BLOOD PRESSURE: 118 MMHG | TEMPERATURE: 97.9 F | RESPIRATION RATE: 16 BRPM

## 2025-07-26 ASSESSMENT — ENCOUNTER SYMPTOMS
FATIGUE: 1
ARTHRALGIAS: 1
BACK PAIN: 1
ACTIVITY CHANGE: 1

## 2025-08-20 ENCOUNTER — APPOINTMENT (OUTPATIENT)
Dept: PRIMARY CARE | Facility: CLINIC | Age: 85
End: 2025-08-20
Payer: MEDICARE

## 2025-08-20 VITALS
WEIGHT: 239 LBS | HEART RATE: 62 BPM | RESPIRATION RATE: 16 BRPM | DIASTOLIC BLOOD PRESSURE: 62 MMHG | BODY MASS INDEX: 49.95 KG/M2 | SYSTOLIC BLOOD PRESSURE: 122 MMHG | TEMPERATURE: 97.2 F

## 2025-08-20 DIAGNOSIS — E11.9 DIABETES MELLITUS TYPE 2 WITHOUT RETINOPATHY (MULTI): ICD-10-CM

## 2025-08-20 DIAGNOSIS — N18.32 STAGE 3B CHRONIC KIDNEY DISEASE (MULTI): ICD-10-CM

## 2025-08-20 DIAGNOSIS — K21.9 GASTROESOPHAGEAL REFLUX DISEASE, UNSPECIFIED WHETHER ESOPHAGITIS PRESENT: ICD-10-CM

## 2025-08-20 DIAGNOSIS — S80.822S: ICD-10-CM

## 2025-08-20 DIAGNOSIS — E55.9 MILD VITAMIN D DEFICIENCY: ICD-10-CM

## 2025-08-20 DIAGNOSIS — D50.9 IRON DEFICIENCY ANEMIA, UNSPECIFIED IRON DEFICIENCY ANEMIA TYPE: Primary | ICD-10-CM

## 2025-08-20 DIAGNOSIS — R39.15 URGENCY OF URINATION: ICD-10-CM

## 2025-08-20 DIAGNOSIS — E78.2 HYPERLIPIDEMIA, MIXED: ICD-10-CM

## 2025-08-20 DIAGNOSIS — R53.81 MALAISE AND FATIGUE: ICD-10-CM

## 2025-08-20 DIAGNOSIS — E53.8 VITAMIN B 12 DEFICIENCY: ICD-10-CM

## 2025-08-20 DIAGNOSIS — R53.83 MALAISE AND FATIGUE: ICD-10-CM

## 2025-08-20 LAB
25(OH)D3 SERPL-MCNC: 38 NG/ML (ref 30–100)
ALBUMIN SERPL BCP-MCNC: 3.4 G/DL (ref 3.4–5)
ALP SERPL-CCNC: 106 U/L (ref 45–117)
ALT SERPL W P-5'-P-CCNC: 19 U/L (ref 16–63)
ANION GAP SERPL CALC-SCNC: 13 MMOL/L (ref 10–20)
APPEARANCE UR: CLEAR
AST SERPL W P-5'-P-CCNC: 17 U/L (ref 15–37)
BASOPHILS # BLD AUTO: 0.03 X10*3/UL (ref 0.1–1.6)
BASOPHILS NFR BLD AUTO: 0.47 % (ref 0–0.3)
BILIRUB SERPL-MCNC: 0.5 MG/DL (ref 0.2–1)
BILIRUB UR QL STRIP: NEGATIVE
BUN SERPL-MCNC: 31 MG/DL (ref 7–18)
CALCIUM SERPL-MCNC: 9.3 MG/DL (ref 8.5–10.1)
CHLORIDE SERPL-SCNC: 103 MMOL/L (ref 98–107)
CHOLEST SERPL-MCNC: 208 MG/DL (ref 0–199)
CHOLESTEROL/HDL RATIO: 3.5 (ref 4.2–7)
CO2 SERPL-SCNC: 31 MMOL/L (ref 21–32)
COLOR UR: YELLOW
CREAT SERPL-MCNC: 1.55 MG/DL (ref 0.6–1.1)
CREAT UR STRIP-MCNC: 200 MG/DL
EGFRCR SERPLBLD CKD-EPI 2021: 33 ML/MIN/1.73M*2
EOSINOPHIL # BLD AUTO: 0.17 X10*3/UL (ref 0.04–0.5)
EOSINOPHIL NFR BLD AUTO: 2.5 % (ref 0.7–7)
ERYTHROCYTE [DISTWIDTH] IN BLOOD BY AUTOMATED COUNT: 14.9 % (ref 11.5–14.5)
GLUCOSE SERPL-MCNC: 105 MG/DL (ref 74–100)
GLUCOSE UR STRIP-MCNC: NEGATIVE MG/DL
HBA1C MFR BLD: 6.1 %
HCT VFR BLD AUTO: 42.1 % (ref 36.6–46.6)
HDLC SERPL-MCNC: 59 MG/DL (ref 40–59)
HGB BLD-MCNC: 13.47 G/DL (ref 12–15.4)
HGB UR QL STRIP: NEGATIVE
IS PATIENT FASTING: YES
KETONES UR STRIP-MCNC: NEGATIVE MG/DL
LDLC SERPL DIRECT ASSAY-MCNC: 123 MG/DL (ref 0–100)
LEUKOCYTE ESTERASE UR QL STRIP: ABNORMAL
LYMPHOCYTES # BLD AUTO: 1.93 X10*3/UL (ref 0–6)
LYMPHOCYTES NFR BLD AUTO: 27.71 % (ref 20.5–51.1)
MCH RBC QN AUTO: 29.4 PG (ref 26–32)
MCHC RBC AUTO-ENTMCNC: 32 G/DL (ref 31–38)
MCV RBC AUTO: 91.9 FL (ref 80–96)
MICROALBUMIN UR TEST STR-MCNC: 30 MG/L
MONOCYTES # BLD AUTO: 0.63 X10*3/UL (ref 1.6–24.9)
MONOCYTES NFR BLD AUTO: 8.97 % (ref 1.7–9.3)
NEUTROPHILS # BLD AUTO: 4.21 X10*3/UL (ref 1.4–6.5)
NEUTROPHILS NFR BLD AUTO: 60.35 % (ref 42.2–75.2)
NITRITE UR QL STRIP: NEGATIVE
PH UR STRIP: 5.5 [PH]
PLATELET # BLD AUTO: 230.3 X10*3/UL (ref 150–450)
PMV BLD AUTO: 8.09 FL (ref 7.8–11)
POTASSIUM SERPL-SCNC: 5 MMOL/L (ref 3.5–5.1)
PROT SERPL-MCNC: 6.6 G/DL (ref 6.4–8.2)
PROT UR STRIP-MCNC: NEGATIVE MG/DL
PROT/CREAT UR: <30 UG/MG CREAT
RBC # BLD AUTO: 4.58 X10*6/UL (ref 3.9–5.3)
SODIUM SERPL-SCNC: 142 MMOL/L (ref 136–145)
SP GR UR STRIP.AUTO: 1.02
TRIGL SERPL-MCNC: 96 MG/DL
TSH SERPL-ACNC: 3.45 MIU/L (ref 0.44–3.98)
UROBILINOGEN UR STRIP-ACNC: 0.2 E.U./DL
VIT B12 SERPL-MCNC: 449 PG/ML (ref 193–986)
WBC # BLD AUTO: 6.97 X10*3/UL (ref 4.5–10.5)

## 2025-08-20 RX ORDER — ESOMEPRAZOLE MAGNESIUM 40 MG/1
40 CAPSULE, DELAYED RELEASE ORAL
Qty: 30 CAPSULE | Refills: 6 | Status: SHIPPED | OUTPATIENT
Start: 2025-08-20 | End: 2025-09-19

## 2025-08-20 ASSESSMENT — PAIN SCALES - GENERAL: PAINLEVEL_OUTOF10: 6

## 2025-08-20 ASSESSMENT — ENCOUNTER SYMPTOMS
UNEXPECTED WEIGHT CHANGE: 0
ACTIVITY CHANGE: 1
FATIGUE: 1

## 2025-08-22 ENCOUNTER — TELEPHONE (OUTPATIENT)
Dept: PRIMARY CARE | Facility: CLINIC | Age: 85
End: 2025-08-22
Payer: MEDICARE

## 2025-08-22 LAB — BACTERIA UR CULT: NORMAL

## 2025-08-28 DIAGNOSIS — M19.90 UNSPECIFIED OSTEOARTHRITIS, UNSPECIFIED SITE: ICD-10-CM

## 2025-08-28 RX ORDER — ACETAMINOPHEN 500 MG
TABLET ORAL
Qty: 100 TABLET | Refills: 3 | Status: SHIPPED | OUTPATIENT
Start: 2025-08-28

## 2026-05-07 ENCOUNTER — APPOINTMENT (OUTPATIENT)
Dept: PRIMARY CARE | Facility: CLINIC | Age: 86
End: 2026-05-07
Payer: MEDICARE